# Patient Record
Sex: MALE | Race: WHITE | NOT HISPANIC OR LATINO | Employment: OTHER | URBAN - NONMETROPOLITAN AREA
[De-identification: names, ages, dates, MRNs, and addresses within clinical notes are randomized per-mention and may not be internally consistent; named-entity substitution may affect disease eponyms.]

---

## 2020-07-27 ENCOUNTER — HOSPITAL ENCOUNTER (INPATIENT)
Facility: HOSPITAL | Age: 75
LOS: 2 days | Discharge: HOME | DRG: 176 | End: 2020-07-29
Attending: EMERGENCY MEDICINE | Admitting: FAMILY MEDICINE
Payer: MEDICARE

## 2020-07-27 ENCOUNTER — APPOINTMENT (EMERGENCY)
Dept: CT IMAGING | Facility: HOSPITAL | Age: 75
DRG: 176 | End: 2020-07-27
Payer: MEDICARE

## 2020-07-27 ENCOUNTER — APPOINTMENT (INPATIENT)
Dept: NON INVASIVE DIAGNOSTICS | Facility: HOSPITAL | Age: 75
DRG: 176 | End: 2020-07-27
Payer: MEDICARE

## 2020-07-27 DIAGNOSIS — I10 HTN (HYPERTENSION): ICD-10-CM

## 2020-07-27 DIAGNOSIS — I26.99 ACUTE PULMONARY EMBOLISM WITHOUT ACUTE COR PULMONALE, UNSPECIFIED PULMONARY EMBOLISM TYPE (HCC): Primary | ICD-10-CM

## 2020-07-27 DIAGNOSIS — R07.1 CHEST PAIN ON BREATHING: ICD-10-CM

## 2020-07-27 DIAGNOSIS — I26.99 OTHER ACUTE PULMONARY EMBOLISM WITHOUT ACUTE COR PULMONALE (HCC): ICD-10-CM

## 2020-07-27 PROBLEM — E87.2 LACTIC ACIDOSIS: Status: ACTIVE | Noted: 2020-07-27

## 2020-07-27 PROBLEM — R07.9 CHEST PAIN: Status: ACTIVE | Noted: 2020-07-27

## 2020-07-27 PROBLEM — N17.9 ACUTE KIDNEY INJURY (HCC): Status: ACTIVE | Noted: 2020-07-27

## 2020-07-27 PROBLEM — E87.20 LACTIC ACIDOSIS: Status: ACTIVE | Noted: 2020-07-27

## 2020-07-27 LAB
ALBUMIN SERPL BCP-MCNC: 3.8 G/DL (ref 3.5–5)
ALP SERPL-CCNC: 68 U/L (ref 46–116)
ALT SERPL W P-5'-P-CCNC: 28 U/L (ref 12–78)
ANION GAP SERPL CALCULATED.3IONS-SCNC: 10 MMOL/L (ref 4–13)
APTT PPP: 30 SECONDS (ref 23–37)
APTT PPP: >210 SECONDS (ref 23–37)
AST SERPL W P-5'-P-CCNC: 16 U/L (ref 5–45)
BASOPHILS # BLD AUTO: 0.02 THOUSANDS/ΜL (ref 0–0.1)
BASOPHILS NFR BLD AUTO: 0 % (ref 0–1)
BILIRUB SERPL-MCNC: 0.59 MG/DL (ref 0.2–1)
BUN SERPL-MCNC: 40 MG/DL (ref 5–25)
CALCIUM SERPL-MCNC: 9 MG/DL (ref 8.3–10.1)
CHLORIDE SERPL-SCNC: 106 MMOL/L (ref 100–108)
CO2 SERPL-SCNC: 26 MMOL/L (ref 21–32)
CREAT SERPL-MCNC: 1.41 MG/DL (ref 0.6–1.3)
EOSINOPHIL # BLD AUTO: 0.18 THOUSAND/ΜL (ref 0–0.61)
EOSINOPHIL NFR BLD AUTO: 3 % (ref 0–6)
ERYTHROCYTE [DISTWIDTH] IN BLOOD BY AUTOMATED COUNT: 12.2 % (ref 11.6–15.1)
GFR SERPL CREATININE-BSD FRML MDRD: 49 ML/MIN/1.73SQ M
GLUCOSE SERPL-MCNC: 77 MG/DL (ref 65–140)
HCT VFR BLD AUTO: 41.3 % (ref 36.5–49.3)
HGB BLD-MCNC: 14.3 G/DL (ref 12–17)
IMM GRANULOCYTES # BLD AUTO: 0.02 THOUSAND/UL (ref 0–0.2)
IMM GRANULOCYTES NFR BLD AUTO: 0 % (ref 0–2)
INR PPP: 1 (ref 0.84–1.19)
LACTATE SERPL-SCNC: 1.1 MMOL/L (ref 0.5–2)
LACTATE SERPL-SCNC: 2.2 MMOL/L (ref 0.5–2)
LIPASE SERPL-CCNC: 255 U/L (ref 73–393)
LYMPHOCYTES # BLD AUTO: 1.57 THOUSANDS/ΜL (ref 0.6–4.47)
LYMPHOCYTES NFR BLD AUTO: 24 % (ref 14–44)
MCH RBC QN AUTO: 31.7 PG (ref 26.8–34.3)
MCHC RBC AUTO-ENTMCNC: 34.6 G/DL (ref 31.4–37.4)
MCV RBC AUTO: 92 FL (ref 82–98)
MONOCYTES # BLD AUTO: 0.61 THOUSAND/ΜL (ref 0.17–1.22)
MONOCYTES NFR BLD AUTO: 10 % (ref 4–12)
NEUTROPHILS # BLD AUTO: 4.04 THOUSANDS/ΜL (ref 1.85–7.62)
NEUTS SEG NFR BLD AUTO: 63 % (ref 43–75)
NRBC BLD AUTO-RTO: 0 /100 WBCS
NT-PROBNP SERPL-MCNC: 123 PG/ML
PLATELET # BLD AUTO: 151 THOUSANDS/UL (ref 149–390)
PMV BLD AUTO: 9.2 FL (ref 8.9–12.7)
POTASSIUM SERPL-SCNC: 3.8 MMOL/L (ref 3.5–5.3)
PROT SERPL-MCNC: 6.9 G/DL (ref 6.4–8.2)
PROTHROMBIN TIME: 13.2 SECONDS (ref 11.6–14.5)
RBC # BLD AUTO: 4.51 MILLION/UL (ref 3.88–5.62)
SODIUM SERPL-SCNC: 142 MMOL/L (ref 136–145)
TROPONIN I SERPL-MCNC: <0.02 NG/ML
WBC # BLD AUTO: 6.44 THOUSAND/UL (ref 4.31–10.16)

## 2020-07-27 PROCEDURE — 36415 COLL VENOUS BLD VENIPUNCTURE: CPT | Performed by: EMERGENCY MEDICINE

## 2020-07-27 PROCEDURE — 99448 NTRPROF PH1/NTRNET/EHR 21-30: CPT | Performed by: INTERNAL MEDICINE

## 2020-07-27 PROCEDURE — 85025 COMPLETE CBC W/AUTO DIFF WBC: CPT | Performed by: EMERGENCY MEDICINE

## 2020-07-27 PROCEDURE — 93306 TTE W/DOPPLER COMPLETE: CPT

## 2020-07-27 PROCEDURE — 83605 ASSAY OF LACTIC ACID: CPT | Performed by: EMERGENCY MEDICINE

## 2020-07-27 PROCEDURE — 85730 THROMBOPLASTIN TIME PARTIAL: CPT | Performed by: FAMILY MEDICINE

## 2020-07-27 PROCEDURE — 80053 COMPREHEN METABOLIC PANEL: CPT | Performed by: EMERGENCY MEDICINE

## 2020-07-27 PROCEDURE — 96361 HYDRATE IV INFUSION ADD-ON: CPT

## 2020-07-27 PROCEDURE — 96360 HYDRATION IV INFUSION INIT: CPT

## 2020-07-27 PROCEDURE — 99285 EMERGENCY DEPT VISIT HI MDM: CPT | Performed by: EMERGENCY MEDICINE

## 2020-07-27 PROCEDURE — 99222 1ST HOSP IP/OBS MODERATE 55: CPT | Performed by: FAMILY MEDICINE

## 2020-07-27 PROCEDURE — 83690 ASSAY OF LIPASE: CPT | Performed by: EMERGENCY MEDICINE

## 2020-07-27 PROCEDURE — 85610 PROTHROMBIN TIME: CPT | Performed by: EMERGENCY MEDICINE

## 2020-07-27 PROCEDURE — 83880 ASSAY OF NATRIURETIC PEPTIDE: CPT | Performed by: EMERGENCY MEDICINE

## 2020-07-27 PROCEDURE — 71275 CT ANGIOGRAPHY CHEST: CPT

## 2020-07-27 PROCEDURE — 99285 EMERGENCY DEPT VISIT HI MDM: CPT

## 2020-07-27 PROCEDURE — 85730 THROMBOPLASTIN TIME PARTIAL: CPT | Performed by: EMERGENCY MEDICINE

## 2020-07-27 PROCEDURE — 84484 ASSAY OF TROPONIN QUANT: CPT | Performed by: EMERGENCY MEDICINE

## 2020-07-27 RX ORDER — SODIUM CHLORIDE 9 MG/ML
75 INJECTION, SOLUTION INTRAVENOUS CONTINUOUS
Status: DISCONTINUED | OUTPATIENT
Start: 2020-07-27 | End: 2020-07-28

## 2020-07-27 RX ORDER — MELATONIN
50000 DAILY
COMMUNITY
End: 2020-07-29 | Stop reason: HOSPADM

## 2020-07-27 RX ORDER — ATORVASTATIN CALCIUM 10 MG/1
10 TABLET, FILM COATED ORAL 2 TIMES WEEKLY
COMMUNITY

## 2020-07-27 RX ORDER — HEPARIN SODIUM 1000 [USP'U]/ML
3800 INJECTION, SOLUTION INTRAVENOUS; SUBCUTANEOUS
Status: DISCONTINUED | OUTPATIENT
Start: 2020-07-27 | End: 2020-07-29

## 2020-07-27 RX ORDER — RIBOFLAVIN (VITAMIN B2) 100 MG
3000 TABLET ORAL DAILY
COMMUNITY

## 2020-07-27 RX ORDER — HEPARIN SODIUM 10000 [USP'U]/100ML
3-30 INJECTION, SOLUTION INTRAVENOUS
Status: DISCONTINUED | OUTPATIENT
Start: 2020-07-27 | End: 2020-07-29

## 2020-07-27 RX ORDER — HEPARIN SODIUM 1000 [USP'U]/ML
7600 INJECTION, SOLUTION INTRAVENOUS; SUBCUTANEOUS ONCE
Status: COMPLETED | OUTPATIENT
Start: 2020-07-27 | End: 2020-07-27

## 2020-07-27 RX ORDER — CALCIUM CARBONATE 200(500)MG
1000 TABLET,CHEWABLE ORAL DAILY PRN
Status: DISCONTINUED | OUTPATIENT
Start: 2020-07-27 | End: 2020-07-29 | Stop reason: HOSPADM

## 2020-07-27 RX ORDER — KETOROLAC TROMETHAMINE 30 MG/ML
15 INJECTION, SOLUTION INTRAMUSCULAR; INTRAVENOUS ONCE
Status: DISCONTINUED | OUTPATIENT
Start: 2020-07-27 | End: 2020-07-27

## 2020-07-27 RX ORDER — ATORVASTATIN CALCIUM 10 MG/1
10 TABLET, FILM COATED ORAL 2 TIMES WEEKLY
Status: DISCONTINUED | OUTPATIENT
Start: 2020-07-27 | End: 2020-07-29 | Stop reason: HOSPADM

## 2020-07-27 RX ORDER — HEPARIN SODIUM 1000 [USP'U]/ML
7600 INJECTION, SOLUTION INTRAVENOUS; SUBCUTANEOUS
Status: DISCONTINUED | OUTPATIENT
Start: 2020-07-27 | End: 2020-07-29

## 2020-07-27 RX ORDER — MULTIVITAMIN
1 CAPSULE ORAL DAILY
COMMUNITY

## 2020-07-27 RX ORDER — ACETAMINOPHEN 325 MG/1
650 TABLET ORAL EVERY 6 HOURS PRN
Status: DISCONTINUED | OUTPATIENT
Start: 2020-07-27 | End: 2020-07-29 | Stop reason: HOSPADM

## 2020-07-27 RX ADMIN — HEPARIN SODIUM 7600 UNITS: 1000 INJECTION, SOLUTION INTRAVENOUS; SUBCUTANEOUS at 14:06

## 2020-07-27 RX ADMIN — IOHEXOL 85 ML: 350 INJECTION, SOLUTION INTRAVENOUS at 12:21

## 2020-07-27 RX ADMIN — MORPHINE SULFATE 2 MG: 2 INJECTION, SOLUTION INTRAMUSCULAR; INTRAVENOUS at 22:47

## 2020-07-27 RX ADMIN — SODIUM CHLORIDE 75 ML/HR: 0.9 INJECTION, SOLUTION INTRAVENOUS at 15:58

## 2020-07-27 RX ADMIN — SODIUM CHLORIDE 1000 ML: 0.9 INJECTION, SOLUTION INTRAVENOUS at 10:46

## 2020-07-27 RX ADMIN — HEPARIN SODIUM AND DEXTROSE 18 UNITS/KG/HR: 10000; 5 INJECTION INTRAVENOUS at 14:05

## 2020-07-27 RX ADMIN — MORPHINE SULFATE 2 MG: 2 INJECTION, SOLUTION INTRAMUSCULAR; INTRAVENOUS at 14:56

## 2020-07-27 NOTE — ED PROVIDER NOTES
History  Chief Complaint   Patient presents with    Chest Pain     Pt  c/o rigth lateral chest pain near axillary area beginning yesterday after working outside - relieved by Tylenol- same today - hurts with inspiration     Patient was working on a should yesterday when he developed right chest pain around his right axilla region  Got worse with deep breath  Took Tylenol yesterday with some relief  Pain again occurred today and took some warm Tylenol again with some relief  Still gets worse with very deep breath  No history of PE or DVT  No rash or trauma  No recent cough or cold symptoms  No change with certain movements  No history of similar episodes  No diaphoresis  No nausea or vomiting  Does not smoke  No history of hypertension  No history of diabetes  No history of heart disease  Pain has never gone completely away since yesterday  Patient recently had surgery on his right forearm secondary to melanoma  History provided by:  Patient   used: No    Chest Pain   Pain location:  R chest  Pain quality: aching    Pain radiates to:  Does not radiate  Pain radiates to the back: no    Pain severity:  Mild  Onset quality:  Sudden  Duration:  1 day  Timing:  Constant  Progression:  Waxing and waning  Chronicity:  New  Context: breathing    Context: not eating, not lifting, not raising an arm, not at rest and no trauma    Relieved by: Tylenol    Worsened by:  Deep breathing  Ineffective treatments:  None tried  Associated symptoms: no abdominal pain, no altered mental status, no anorexia, no back pain, no claudication, no cough, no diaphoresis, no dizziness, no dysphagia, no fatigue, no fever, no headache, no nausea, no near-syncope, no palpitations, no shortness of breath and not vomiting    Risk factors: no aortic disease, no Marfan's syndrome, no prior DVT/PE and no smoking        None       Past Medical History:   Diagnosis Date    Basal cell carcinoma of skin     multiple sites    Hypercholesterolemia     Skin cancer (melanoma) ([facility] [address] )     stage 0       History reviewed  No pertinent surgical history  History reviewed  No pertinent family history  I have reviewed and agree with the history as documented  E-Cigarette/Vaping    E-Cigarette Use Never User      E-Cigarette/Vaping Substances     Social History     Tobacco Use    Smoking status: Former Smoker    Smokeless tobacco: Never Used   Substance Use Topics    Alcohol use: Yes     Frequency: Monthly or less     Drinks per session: 1 or 2     Binge frequency: Never    Drug use: Not Currently       Review of Systems   Constitutional: Negative for chills, diaphoresis, fatigue and fever  HENT: Negative for ear pain, hearing loss, sore throat, trouble swallowing and voice change  Eyes: Negative for pain and discharge  Respiratory: Negative for cough, shortness of breath and wheezing  Cardiovascular: Positive for chest pain  Negative for palpitations, claudication and near-syncope  Gastrointestinal: Negative for abdominal pain, anorexia, blood in stool, constipation, diarrhea, nausea and vomiting  Genitourinary: Negative for dysuria, flank pain, frequency and hematuria  Musculoskeletal: Negative for back pain, joint swelling, neck pain and neck stiffness  Skin: Negative for rash and wound  Neurological: Negative for dizziness, seizures, syncope, facial asymmetry and headaches  Psychiatric/Behavioral: Negative for hallucinations, self-injury and suicidal ideas  All other systems reviewed and are negative  Physical Exam  Physical Exam   Constitutional: He is oriented to person, place, and time  He appears well-developed and well-nourished  No distress  HENT:   Head: Normocephalic and atraumatic  Right Ear: External ear normal    Left Ear: External ear normal    Eyes: Pupils are equal, round, and reactive to light  Conjunctivae and EOM are normal    Neck: Normal range of motion  Neck supple  Cardiovascular: Normal rate, regular rhythm and normal heart sounds  No murmur heard  Pulmonary/Chest: Effort normal and breath sounds normal  He has no wheezes  He has no rales  Abdominal: Soft  Bowel sounds are normal  He exhibits no distension  There is no tenderness  There is no rebound and no guarding  Musculoskeletal: Normal range of motion  He exhibits no deformity  Left forearm surgical site is clean dry and intact  Healing well  No signs of infection  Neurological: He is alert and oriented to person, place, and time  No cranial nerve deficit  Skin: Skin is warm and dry  No rash noted  Psychiatric: He has a normal mood and affect  His behavior is normal    Nursing note and vitals reviewed        Vital Signs  ED Triage Vitals   Temp Pulse Respirations Blood Pressure SpO2   -- 07/27/20 1030 07/27/20 1030 07/27/20 1030 07/27/20 1030    87 20 148/72 95 %      Temp Source Heart Rate Source Patient Position - Orthostatic VS BP Location FiO2 (%)   07/27/20 1031 07/27/20 1031 07/27/20 1031 07/27/20 1031 --   Temporal Monitor Lying Right arm       Pain Score       07/27/20 1031       No Pain           Vitals:    07/27/20 1030 07/27/20 1031 07/27/20 1100 07/27/20 1200   BP: 148/72 148/72 140/63 148/65   Pulse: 87 84 71 65   Patient Position - Orthostatic VS:  Lying           Visual Acuity      ED Medications  Medications   ketorolac (TORADOL) injection 15 mg (15 mg Intravenous Not Given 7/27/20 1043)   heparin (VTE/PE) high (has no administration in time range)   sodium chloride 0 9 % bolus 1,000 mL (0 mL Intravenous Stopped 7/27/20 1224)   iohexol (OMNIPAQUE) 350 MG/ML injection (MULTI-DOSE) 85 mL (85 mL Intravenous Given 7/27/20 1221)       Diagnostic Studies  Results Reviewed     Procedure Component Value Units Date/Time    Protime-INR [[id number]]     Lab Status:  No result Specimen:  Blood     APTT [[id number]]     Lab Status:  No result Specimen:  Blood     Lactic acid 2 Hours [[id number]] (Normal) Collected:  07/27/20 1227    Lab Status:  Final result Specimen:  Blood from Arm, Left Updated:  07/27/20 1252     LACTIC ACID 1 1 mmol/L     Narrative:       Result may be elevated if tourniquet was used during collection  NT-BNP PRO [[id number]]  (Normal) Collected:  07/27/20 1043    Lab Status:  Final result Specimen:  Blood from Arm, Left Updated:  07/27/20 1141     NT-proBNP 123 pg/mL     Comprehensive metabolic panel [[id number]]  (Abnormal) Collected:  07/27/20 1043    Lab Status:  Final result Specimen:  Blood from Arm, Left Updated:  07/27/20 1120     Sodium 142 mmol/L      Potassium 3 8 mmol/L      Chloride 106 mmol/L      CO2 26 mmol/L      ANION GAP 10 mmol/L      BUN 40 mg/dL      Creatinine 1 41 mg/dL      Glucose 77 mg/dL      Calcium 9 0 mg/dL      AST 16 U/L      ALT 28 U/L      Alkaline Phosphatase 68 U/L      Total Protein 6 9 g/dL      Albumin 3 8 g/dL      Total Bilirubin 0 59 mg/dL      eGFR 49 ml/min/1 73sq m     Narrative:       [place] guidelines for Chronic Kidney Disease (CKD):     Stage 1 with normal or high GFR (GFR > 90 mL/min/1 73 square meters)    Stage 2 Mild CKD (GFR = 60-89 mL/min/1 73 square meters)    Stage 3A Moderate CKD (GFR = 45-59 mL/min/1 73 square meters)    Stage 3B Moderate CKD (GFR = 30-44 mL/min/1 73 square meters)    Stage 4 Severe CKD (GFR = 15-29 mL/min/1 73 square meters)    Stage 5 End Stage CKD (GFR <15 mL/min/1 73 square meters)  Note: GFR calculation is accurate only with a steady state creatinine    Lipase [[id number]]  (Normal) Collected:  07/27/20 1043    Lab Status:  Final result Specimen:  Blood from Arm, Left Updated:  07/27/20 1120     Lipase 255 u/L     Lactic acid [[id number]]  (Abnormal) Collected:  07/27/20 1043    Lab Status:  Final result Specimen:  Blood from Arm, Left Updated:  07/27/20 1114     LACTIC ACID 2 2 mmol/L     Narrative:       Result may be elevated if tourniquet was used during collection  Troponin I [[id number]]  (Normal) Collected:  07/27/20 1043    Lab Status:  Final result Specimen:  Blood from Arm, Left Updated:  07/27/20 1110     Troponin I <0 02 ng/mL     CBC and differential [[id number]] Collected:  07/27/20 1043    Lab Status:  Final result Specimen:  Blood from Arm, Left Updated:  07/27/20 1056     WBC 6 44 Thousand/uL      RBC 4 51 Million/uL      Hemoglobin 14 3 g/dL      Hematocrit 41 3 %      MCV 92 fL      MCH 31 7 pg      MCHC 34 6 g/dL      RDW 12 2 %      MPV 9 2 fL      Platelets 224 Thousands/uL      nRBC 0 /100 WBCs      Neutrophils Relative 63 %      Immat GRANS % 0 %      Lymphocytes Relative 24 %      Monocytes Relative 10 %      Eosinophils Relative 3 %      Basophils Relative 0 %      Neutrophils Absolute 4 04 Thousands/µL      Immature Grans Absolute 0 02 Thousand/uL      Lymphocytes Absolute 1 57 Thousands/µL      Monocytes Absolute 0 61 Thousand/µL      Eosinophils Absolute 0 18 Thousand/µL      Basophils Absolute 0 02 Thousands/µL                  CTA ED chest PE study   Final Result by Yovany Marrero MD (07/27 1246)      Acute pulmonary emboli  Measured RV/LV ratio is within normal limits at less than 0 9  I personally discussed this study with Dr Dustin Torres in the ED at [facility] in [facility] on 7/27/2020 at 12:38 PM                Workstation performed: [id number]                    Procedures  ECG 12 Lead Documentation Only  Date/Time: 7/27/2020 10:39 AM  Performed by: James Ness MD  Authorized by: James Ness MD     ECG reviewed by me, the ED Provider: yes    Patient location:  ED  Previous ECG:     Previous ECG:  Unavailable  Rate:     ECG rate:  90  Rhythm:     Rhythm: sinus rhythm    Ectopy:     Ectopy: none    QRS:     QRS axis:  Normal             ED Course       US AUDIT      Most Recent Value   Initial Alcohol Screen: US AUDIT-C    1  How often do you have a drink containing alcohol? 2 Filed at: 07/27/2020 1036   2   How many drinks containing alcohol do you have on a typical day you are drinking? 0 Filed at: 07/27/2020 1036   3a  Male UNDER 65: How often do you have five or more drinks on one occasion? 0 Filed at: 07/27/2020 1036   3b  FEMALE Any Age, or MALE 65+: How often do you have 4 or more drinks on one occassion? 2 Filed at: 07/27/2020 1036   Audit-C Score  4 Filed at: 07/27/2020 1036                  CARIN/DAST-10      Most Recent Value   How many times in the past year have you    Used an illegal drug or used a prescription medication for non-medical reasons? Never Filed at: 07/27/2020 1036                                MDM  Number of Diagnoses or Management Options     Amount and/or Complexity of Data Reviewed  Clinical lab tests: reviewed  Review and summarize past medical records: yes          Disposition  Final diagnoses:   Acute pulmonary embolism without acute cor pulmonale, unspecified pulmonary embolism type ([address] )     Time reflects when diagnosis was documented in both MDM as applicable and the Disposition within this note     Time User Action Codes Description Comment    7/27/2020 12:52 PM Visperas, Retia Runner P Add [I26 99] Acute pulmonary embolism without acute cor pulmonale, unspecified pulmonary embolism type [facility])       ED Disposition     ED Disposition Condition Date/Time Comment    Admit Stable Mon Jul 27, 2020 12:52 PM Case was discussed with Jhonathan Acharya and the patient's admission status was agreed to be  to the service of Dr Jhonathan Hussein  Follow-up Information    None         Patient's Medications    No medications on file     No discharge procedures on file      PDMP Review     None          ED Provider  Electronically Signed by           Cass Penn MD  07/27/20 8010

## 2020-07-27 NOTE — CONSULTS
Interprofessional Telephone Consultation Note - Pulmonary and Critical Care Medicine  Marcie Rush 77 y o  male MRN: [id number]  Unit/Bed#: ED 03 Encounter: [id number]    Consult requested location:  [address] [place] [address]  Physician Requesting Consult: Farzad Kaufman MD  Reason for Consult:  PE    Assessment/Plan:    1  Acute unprovoked PE, no clear etiology or risk factors  · Continue anticoagulation with heparin infusion  · Transition to NOAC preferably if covered by insurance otherwise he will need Coumadin with bridging with heparin infusion for at least 5 days to maintain INR 2-3  · If patient being treated with NOAC and stable to more than he can be discharged home  · Will need anticoagulation for at least 4-6 months  · Outpatient follow up with Pulmonary in the future if needed  · Follow with his PCP for cancer screening workup appropriate for his age  3  Mild TORY versus a chronic CKD, unknown baseline  · Recommend hydration p o  And IV overnight, check BMP in the morning  3  History of resected melanoma    Pertinent details:   Patient is otherwise healthy except for resected small melanomas with good margin and no evidence of metastasis, presents with chest pain and found to have right-sided main stem PE  He is currently doing fine with no complaints and no oxygen needs  No recent travel history, no recent infectious process, no recent surgery or illness  No other history of cancer  Currently on heparin drip  I reviewed labs including BMP/CBC, creatinine 1 41, hemoglobin 14 3, troponin 0 02, proBNP 123    Chest CT scan reviewed on PACs:  Large right pulmonary artery PE extending to the right lower lobe  otherwise no significant infiltrates except for some atelectatic take changes in the right lower lobe and right upper lobe    Echocardiogram pending        This evaluation was performed remotely since pulmonology service was not available for immediate bedside evaluation    Personal interview of the patient and physical examination therefore has not been performed  Time spent for discussion with the requesting provider, review of the chart, evaluation of diagnostic data and review of the imaging personally was 30 minutes  Recommendations were personally discussed with Dr Brooklyn Rosario   >50% of the time documented was direct discussion with the requesting provider    Nato Henao MD

## 2020-07-27 NOTE — ASSESSMENT & PLAN NOTE
Right-sided chest pain secondary to acute pulmonary embolism  Continue supportive care and morphine for pain control in addition to heparin drip    EKG shows normal sinus rhythm with no ST segment changes

## 2020-07-27 NOTE — ASSESSMENT & PLAN NOTE
Probably secondary to acute pulmonary embolism  No evidence of infection at this time  Received IV fluid hydration following which lactic acidosis was resolving

## 2020-07-27 NOTE — ASSESSMENT & PLAN NOTE
Patient presented with 2 day history of right upper chest pain at rest and with exertion  Found to have acute right main pulmonary artery embolism as well as right upper and lower lobe pulmonary arterial branches on CTA chest   Patient denies any recent travel or long car rides or any other provoking factors  He states that he has been experiencing some progressive shortness of breath over the last few months which he felt was secondary to old age    Currently denies any shortness of breath at rest but does have shortness of breath with exertion  Denies any syncopal episodes or palpitations  Placed on heparin drip  Stat 2D echo obtained to evaluate RV function  Recheck labs tomorrow and will transition to Eliquis if hemodynamically stable tomorrow  Will probably need to be anticoagulated for at least 6 months as it is an unprovoked episode    No prior history of blood clots

## 2020-07-27 NOTE — ASSESSMENT & PLAN NOTE
Baseline creatinine unknown  Creatinine admission is 1 4    Placed on IV fluid hydration and avoid nephrotoxic agents and hypotension

## 2020-07-27 NOTE — PLAN OF CARE
Problem: Potential for Falls  Goal: Patient will remain free of falls  Description  INTERVENTIONS:  - Assess patient frequently for physical needs  -  Identify cognitive and physical deficits and behaviors that affect risk of falls    -  [place] fall precautions as indicated by assessment   - Educate patient/family on patient safety including physical limitations  - Instruct patient to call for assistance with activity based on assessment  - Modify environment to reduce risk of injury  - Consider OT/PT consult to assist with strengthening/mobility  Outcome: Progressing     Problem: PAIN - ADULT  Goal: Verbalizes/displays adequate comfort level or baseline comfort level  Description  Interventions:  - Encourage patient to monitor pain and request assistance  - Assess pain using appropriate pain scale  - Administer analgesics based on type and severity of pain and evaluate response  - Implement non-pharmacological measures as appropriate and evaluate response  - Consider cultural and social influences on pain and pain management  - Notify physician/advanced practitioner if interventions unsuccessful or patient reports new pain  Outcome: Progressing     Problem: SAFETY ADULT  Goal: Maintain or return to baseline ADL function  Description  INTERVENTIONS:  -  Assess patient's ability to carry out ADLs; assess patient's baseline for ADL function and identify physical deficits which impact ability to perform ADLs (bathing, care of mouth/teeth, toileting, grooming, dressing, etc )  - Assess/evaluate cause of self-care deficits   - Assess range of motion  - Assess patient's mobility; develop plan if impaired  - Assess patient's need for assistive devices and provide as appropriate  - Encourage maximum independence but intervene and supervise when necessary  - Involve family in performance of ADLs  - Assess for home care needs following discharge   - Consider OT consult to assist with ADL evaluation and planning for discharge  - Provide patient education as appropriate  Outcome: Progressing  Goal: Maintain or return mobility status to optimal level  Description  INTERVENTIONS:  - Assess patient's baseline mobility status (ambulation, transfers, stairs, etc )    - Identify cognitive and physical deficits and behaviors that affect mobility  - Identify mobility aids required to assist with transfers and/or ambulation (gait belt, sit-to-stand, lift, walker, cane, etc )  - [place] fall precautions as indicated by assessment  - Record patient progress and toleration of activity level on Mobility SBAR; progress patient to next Phase/Stage  - Instruct patient to call for assistance with activity based on assessment  - Consider rehabilitation consult to assist with strengthening/weightbearing, etc   Outcome: Progressing     Problem: DISCHARGE PLANNING  Goal: Discharge to home or other facility with appropriate resources  Description  INTERVENTIONS:  - Identify barriers to discharge w/patient and caregiver  - Arrange for needed discharge resources and transportation as appropriate  - Identify discharge learning needs (meds, wound care, etc )  - Arrange for interpretive services to assist at discharge as needed  - Refer to Case Management Department for coordinating discharge planning if the patient needs post-hospital services based on physician/advanced practitioner order or complex needs related to functional status, cognitive ability, or social support system  Outcome: Progressing     Problem: Knowledge Deficit  Goal: Patient/family/caregiver demonstrates understanding of disease process, treatment plan, medications, and discharge instructions  Description  Complete learning assessment and assess knowledge base    Interventions:  - Provide teaching at level of understanding  - Provide teaching via preferred learning methods  Outcome: Progressing

## 2020-07-27 NOTE — H&P
H&P- Jennifer Stewart 1945, 77 y o  male MRN: [id number]    Unit/Bed#: ED 03 Encounter: [id number]    Primary Care Provider: Rajesh Palacio MD   Date and time admitted to hospital: 7/27/2020 10:26 AM        * Acute pulmonary embolism without acute cor pulmonale [facility])  Assessment & Plan  Patient presented with 2 day history of right upper chest pain at rest and with exertion  Found to have acute right main pulmonary artery embolism as well as right upper and lower lobe pulmonary arterial branches on CTA chest   Patient denies any recent travel or long car rides or any other provoking factors  He states that he has been experiencing some progressive shortness of breath over the last few months which he felt was secondary to old age    Currently denies any shortness of breath at rest but does have shortness of breath with exertion  Denies any syncopal episodes or palpitations  Placed on heparin drip  Stat 2D echo obtained to evaluate RV function  Recheck labs tomorrow and will transition to Eliquis if hemodynamically stable tomorrow  Will probably need to be anticoagulated for at least 6 months as it is an unprovoked episode  No prior history of blood clots    Chest pain  Assessment & Plan  Right-sided chest pain secondary to acute pulmonary embolism  Continue supportive care and morphine for pain control in addition to heparin drip  EKG shows normal sinus rhythm with no ST segment changes    Lactic acidosis  Assessment & Plan  Probably secondary to acute pulmonary embolism  No evidence of infection at this time  Received IV fluid hydration following which lactic acidosis was resolving  Acute kidney injury [facility])  Assessment & Plan  Baseline creatinine unknown  Creatinine admission is 1 4  Placed on IV fluid hydration and avoid nephrotoxic agents and hypotension    Mixed hyperlipidemia:  Patient states he takes atorvastatin 10 mg twice a week    Continue the same  VTE Prophylaxis: Heparin Drip / reason for no mechanical VTE prophylaxis On heparin drip   Code Status:  Full code  POLST: There is no POLST form on file for this patient (pre-hospital)  Discussion with family:  None    Anticipated Length of Stay:  Patient will be admitted on an Inpatient basis with an anticipated length of stay of  more than 2 midnights  Justification for Hospital Stay:  Chest pain    Total Time for Visit, including Counseling / Coordination of Care: 1 hour  Greater than 50% of this total time spent on direct patient counseling and coordination of care  Chief Complaint:   Right-sided chest pain    History of Present Illness:    Claudio Chun is a 77 y o  male who presents with right-sided chest pain  Patient states over the last few months he has been having some progressively worsening dyspnea with exertion  States that for the last 2 days he started to have right upper chest pain for which he was taking Tylenol several times with limited relief  He decided to come to the ER to get checked out  Denies any recent fevers, chills, cough cold runny nose  No recent travel or sick contact  No prolonged immobilization  No prior history of blood clots  Patient states that he is a relatively healthy man and takes no medications at home except for atorvastatin twice a week    Review of Systems:    Review of Systems   Constitutional: Positive for fatigue  Negative for appetite change, chills and fever  HENT: Negative for hearing loss, sore throat and trouble swallowing  Eyes: Negative for photophobia, discharge and visual disturbance  Respiratory: Positive for shortness of breath  Negative for chest tightness  Cardiovascular: Positive for chest pain  Negative for palpitations  Gastrointestinal: Negative for abdominal pain, blood in stool and vomiting  Endocrine: Negative for polydipsia and polyuria  Genitourinary: Negative for difficulty urinating, dysuria, flank pain and hematuria     Musculoskeletal: Negative for back pain and gait problem  Skin: Negative for rash  Allergic/Immunologic: Negative for environmental allergies and food allergies  Neurological: Negative for dizziness, seizures, syncope and headaches  Hematological: Does not bruise/bleed easily  Psychiatric/Behavioral: Negative for behavioral problems  All other systems reviewed and are negative  Past Medical and Surgical History:     Past Medical History:   Diagnosis Date    Basal cell carcinoma of skin     multiple sites    Hypercholesterolemia     Skin cancer (melanoma) ([facility] [address] )     stage 0       Past Surgical History:   Procedure Laterality Date    ARM SKIN LESION BIOPSY / EXCISION         Meds/Allergies:    Prior to Admission medications    Not on File     I have reviewed home medications with patient personally  Allergies: Allergies   Allergen Reactions    Penicillins Other (See Comments)     Sores on tongue       Social History:     Marital Status:    Social History     Substance and Sexual Activity   Alcohol Use Yes    Frequency: Monthly or less    Drinks per session: 1 or 2    Binge frequency: Never     Social History     Tobacco Use   Smoking Status Former Smoker   Smokeless Tobacco Never Used     Social History     Substance and Sexual Activity   Drug Use Not Currently       Family History:    Family History   Problem Relation Age of Onset    Arthritis Mother        Physical Exam:     Vitals:   Blood Pressure: 166/81 (07/27/20 1408)  Pulse: 70 (07/27/20 1408)  Temp Source: Temporal (07/27/20 1031)  Respirations: 20 (07/27/20 1408)  Height: 6' 1" (185 4 cm) (07/27/20 1031)  Weight - Scale: 96 4 kg (212 lb 8 4 oz) (07/27/20 1031)  SpO2: 96 % (07/27/20 1408)    Physical Exam   Constitutional: He is oriented to person, place, and time  He appears well-developed and well-nourished  HENT:   Head: Normocephalic and atraumatic     Right Ear: External ear normal    Left Ear: External ear normal    Mouth/Throat: Oropharynx is clear and moist    Eyes: Pupils are equal, round, and reactive to light  Conjunctivae and EOM are normal    Neck: Normal range of motion  Neck supple  Cardiovascular: Normal rate, regular rhythm and normal heart sounds  Tenderness on palpation of the right upper chest wall   Pulmonary/Chest: Effort normal and breath sounds normal    Abdominal: Soft  Bowel sounds are normal  He exhibits no mass  There is no tenderness  There is no rebound and no guarding  Genitourinary:   Genitourinary Comments: deferred   Musculoskeletal: Normal range of motion  Neurological: He is alert and oriented to person, place, and time  He has normal reflexes  Cranial nerves 2-12 are normal   Normal neurological exam   Skin: Skin is warm and dry  No rash noted  Psychiatric: He has a normal mood and affect  Nursing note and vitals reviewed  Additional Data:     Lab Results: I have personally reviewed pertinent reports  Results from last 7 days   Lab Units 07/27/20  1043   WBC Thousand/uL 6 44   HEMOGLOBIN g/dL 14 3   HEMATOCRIT % 41 3   PLATELETS Thousands/uL 151   NEUTROS PCT % 63   LYMPHS PCT % 24   MONOS PCT % 10   EOS PCT % 3     Results from last 7 days   Lab Units 07/27/20  1043   SODIUM mmol/L 142   POTASSIUM mmol/L 3 8   CHLORIDE mmol/L 106   CO2 mmol/L 26   BUN mg/dL 40*   CREATININE mg/dL 1 41*   ANION GAP mmol/L 10   CALCIUM mg/dL 9 0   ALBUMIN g/dL 3 8   TOTAL BILIRUBIN mg/dL 0 59   ALK PHOS U/L 68   ALT U/L 28   AST U/L 16   GLUCOSE RANDOM mg/dL 77     Results from last 7 days   Lab Units 07/27/20  1302   INR  1 00             Results from last 7 days   Lab Units 07/27/20  1227 07/27/20  1043   LACTIC ACID mmol/L 1 1 2 2*       Imaging: I have personally reviewed pertinent reports  CTA ED chest PE study   Final Result by Ludmila Gauthier MD (07/27 1246)      Acute pulmonary emboli  Measured RV/LV ratio is within normal limits at less than 0 9                 I personally discussed this study with Dr Alvarez Seen in the ED at [facility] in [facility] on 7/27/2020 at 12:38 PM                Workstation performed: [id number]             EKG, Pathology, and Other Studies Reviewed on Admission:   · EKG:sinus rhythm    Allscripts / Epic Records Reviewed: Yes     ** Please Note: This note has been constructed using a voice recognition system   **

## 2020-07-28 PROBLEM — I10 HTN (HYPERTENSION): Status: ACTIVE | Noted: 2020-07-28

## 2020-07-28 LAB
ANION GAP SERPL CALCULATED.3IONS-SCNC: 7 MMOL/L (ref 4–13)
APTT PPP: 119 SECONDS (ref 23–37)
APTT PPP: 67 SECONDS (ref 23–37)
APTT PPP: 80 SECONDS (ref 23–37)
ATRIAL RATE: 67 BPM
BUN SERPL-MCNC: 30 MG/DL (ref 5–25)
CALCIUM SERPL-MCNC: 8.1 MG/DL (ref 8.3–10.1)
CHLORIDE SERPL-SCNC: 106 MMOL/L (ref 100–108)
CO2 SERPL-SCNC: 26 MMOL/L (ref 21–32)
CREAT SERPL-MCNC: 1.18 MG/DL (ref 0.6–1.3)
ERYTHROCYTE [DISTWIDTH] IN BLOOD BY AUTOMATED COUNT: 12.5 % (ref 11.6–15.1)
GFR SERPL CREATININE-BSD FRML MDRD: 60 ML/MIN/1.73SQ M
GLUCOSE SERPL-MCNC: 97 MG/DL (ref 65–140)
HCT VFR BLD AUTO: 35.9 % (ref 36.5–49.3)
HGB BLD-MCNC: 12.2 G/DL (ref 12–17)
MCH RBC QN AUTO: 31.3 PG (ref 26.8–34.3)
MCHC RBC AUTO-ENTMCNC: 34 G/DL (ref 31.4–37.4)
MCV RBC AUTO: 92 FL (ref 82–98)
P AXIS: 73 DEGREES
PLATELET # BLD AUTO: 148 THOUSANDS/UL (ref 149–390)
PMV BLD AUTO: 9.2 FL (ref 8.9–12.7)
POTASSIUM SERPL-SCNC: 4.2 MMOL/L (ref 3.5–5.3)
PR INTERVAL: 180 MS
QRS AXIS: -9 DEGREES
QRSD INTERVAL: 98 MS
QT INTERVAL: 414 MS
QTC INTERVAL: 437 MS
RBC # BLD AUTO: 3.9 MILLION/UL (ref 3.88–5.62)
SODIUM SERPL-SCNC: 139 MMOL/L (ref 136–145)
T WAVE AXIS: 39 DEGREES
TROPONIN I SERPL-MCNC: <0.02 NG/ML
TSH SERPL DL<=0.05 MIU/L-ACNC: 1.78 UIU/ML (ref 0.36–3.74)
VENTRICULAR RATE: 67 BPM
WBC # BLD AUTO: 6.25 THOUSAND/UL (ref 4.31–10.16)

## 2020-07-28 PROCEDURE — 93005 ELECTROCARDIOGRAM TRACING: CPT

## 2020-07-28 PROCEDURE — 85730 THROMBOPLASTIN TIME PARTIAL: CPT | Performed by: FAMILY MEDICINE

## 2020-07-28 PROCEDURE — 84484 ASSAY OF TROPONIN QUANT: CPT | Performed by: NURSE PRACTITIONER

## 2020-07-28 PROCEDURE — 80048 BASIC METABOLIC PNL TOTAL CA: CPT | Performed by: FAMILY MEDICINE

## 2020-07-28 PROCEDURE — 99232 SBSQ HOSP IP/OBS MODERATE 35: CPT | Performed by: FAMILY MEDICINE

## 2020-07-28 PROCEDURE — 84443 ASSAY THYROID STIM HORMONE: CPT | Performed by: FAMILY MEDICINE

## 2020-07-28 PROCEDURE — 85027 COMPLETE CBC AUTOMATED: CPT | Performed by: FAMILY MEDICINE

## 2020-07-28 RX ORDER — PREDNISONE 20 MG/1
20 TABLET ORAL DAILY
Status: DISCONTINUED | OUTPATIENT
Start: 2020-07-28 | End: 2020-07-29 | Stop reason: HOSPADM

## 2020-07-28 RX ORDER — AMLODIPINE BESYLATE 2.5 MG/1
2.5 TABLET ORAL DAILY
Status: DISCONTINUED | OUTPATIENT
Start: 2020-07-28 | End: 2020-07-29 | Stop reason: HOSPADM

## 2020-07-28 RX ADMIN — PREDNISONE 20 MG: 20 TABLET ORAL at 13:29

## 2020-07-28 RX ADMIN — SODIUM CHLORIDE 75 ML/HR: 0.9 INJECTION, SOLUTION INTRAVENOUS at 04:51

## 2020-07-28 RX ADMIN — ACETAMINOPHEN 650 MG: 325 TABLET ORAL at 17:03

## 2020-07-28 RX ADMIN — MORPHINE SULFATE 2 MG: 2 INJECTION, SOLUTION INTRAMUSCULAR; INTRAVENOUS at 08:47

## 2020-07-28 RX ADMIN — HEPARIN SODIUM AND DEXTROSE 12 UNITS/KG/HR: 10000; 5 INJECTION INTRAVENOUS at 10:38

## 2020-07-28 RX ADMIN — MORPHINE SULFATE 2 MG: 2 INJECTION, SOLUTION INTRAMUSCULAR; INTRAVENOUS at 02:32

## 2020-07-28 RX ADMIN — AMLODIPINE BESYLATE 2.5 MG: 2.5 TABLET ORAL at 13:29

## 2020-07-28 NOTE — ASSESSMENT & PLAN NOTE
Right-sided chest pain secondary to acute pulmonary embolism/pleurisy   Continue supportive care and morphine for pain control in addition to heparin drip    EKG shows normal sinus rhythm with no ST segment changes  No pulmonary infarct   Will give low dose steroids 20 mg po daily x5 days of prednisone to decrease inflammation

## 2020-07-28 NOTE — PROGRESS NOTES
Progress Note - Khanh Pandey 1945, 77 y o  male MRN: [id number]    Unit/Bed#: -01 Encounter: [id number]    Primary Care Provider: Allan Garcia MD   Date and time admitted to hospital: 7/27/2020 10:26 AM        HTN (hypertension)  Assessment & Plan  · No history off - cam be due to pain /white coat htn as he had previously- dc fluids  · Place on low dose norvasc 2 5 mg po daily hold if sbp<140    Lactic acidosis  Assessment & Plan  Probably secondary to acute pulmonary embolism  No evidence of infection at this time  Received IV fluid hydration following which lactic acidosis was resolved    Acute kidney injury [facility])  Assessment & Plan  Resolved Baseline creatinine unknown  Creatinine admission is 1 4  Dc iv fluids avoid nephrotoxic agents and hypotension    Chest pain  Assessment & Plan  Right-sided chest pain secondary to acute pulmonary embolism/pleurisy   Continue supportive care and morphine for pain control in addition to heparin drip  EKG shows normal sinus rhythm with no ST segment changes  No pulmonary infarct   Will give low dose steroids 20 mg po daily x5 days of prednisone to decrease inflammation     * Acute pulmonary embolism without acute cor pulmonale [facility])  Assessment & Plan  Patient presented with 2 day history of right upper chest pain at rest and with exertion  Found to have acute right main pulmonary artery embolism as well as right upper and lower lobe pulmonary arterial branches on CTA chest   Patient denies any recent travel or long car rides or any other provoking factors  He states that he has been experiencing some progressive shortness of breath over the last few months which he felt was secondary to old age    Currently denies any shortness of breath at rest but does have shortness of breath with exertion  Denies any syncopal episodes or palpitations  on heparin drip  Stat 2D echo obtained to evaluate RV function- normall       Will probably need to be anticoagulated for at least 6 months as it is an unprovoked episode  No prior history of blood clots  Send eliquis to pharmacy for insurance check  If pleurisy better amrit will place on elqiuis and dc home      VTE Pharmacologic Prophylaxis:   Pharmacologic: Heparin Drip  Mechanical VTE Prophylaxis in Place: Yes    Patient Centered Rounds: I have performed bedside rounds with nursing staff today  Discussions with Specialists or Other Care Team Provider: nursing     Education and Discussions with Family / Patient: patient    Time Spent for Care: 30 minutes  More than 50% of total time spent on counseling and coordination of care as described above  Current Length of Stay: 1 day(s)    Current Patient Status: Inpatient   Certification Statement: The patient will continue to require additional inpatient hospital stay due to pleurisy    Discharge Plan: 24 hours pending progress    Code Status: Level 1 - Full Code      Subjective:   Patient seen and examined, still pleuritic chest pain  Some sob when ambulates   Objective:     Vitals:   Temp (24hrs), Av °F (36 7 °C), Min:97 7 °F (36 5 °C), Max:98 2 °F (36 8 °C)    Temp:  [97 7 °F (36 5 °C)-98 2 °F (36 8 °C)] 98 °F (36 7 °C)  HR:  [66-77] 76  Resp:  [16-23] 18  BP: (140-191)/() 168/89  SpO2:  [96 %-99 %] 97 %  Body mass index is 27 6 kg/m²  Input and Output Summary (last 24 hours): Intake/Output Summary (Last 24 hours) at 2020 1319  Last data filed at 2020 0845  Gross per 24 hour   Intake 1566 25 ml   Output 1380 ml   Net 186 25 ml       Physical Exam:     Physical Exam   Constitutional: He is oriented to person, place, and time  He appears well-developed and well-nourished  HENT:   Head: Normocephalic and atraumatic  Eyes: Pupils are equal, round, and reactive to light  EOM are normal    Neck: Normal range of motion  Cardiovascular: Normal rate, regular rhythm and normal heart sounds     Pulmonary/Chest: Effort normal  He exhibits no tenderness  Decreased at bases   Abdominal: Soft  Bowel sounds are normal    Musculoskeletal: Normal range of motion  Neurological: He is alert and oriented to person, place, and time  He has normal reflexes  cranial nerve 2-12 are normal   Normal neurological exam   Skin: Skin is warm  Psychiatric: He has a normal mood and affect  Additional Data:     Labs:    Results from last 7 days   Lab Units 07/28/20  0236 07/27/20  1043   WBC Thousand/uL 6 25 6 44   HEMOGLOBIN g/dL 12 2 14 3   HEMATOCRIT % 35 9* 41 3   PLATELETS Thousands/uL 148* 151   NEUTROS PCT %  --  63   LYMPHS PCT %  --  24   MONOS PCT %  --  10   EOS PCT %  --  3     Results from last 7 days   Lab Units 07/28/20  0236 07/27/20  1043   SODIUM mmol/L 139 142   POTASSIUM mmol/L 4 2 3 8   CHLORIDE mmol/L 106 106   CO2 mmol/L 26 26   BUN mg/dL 30* 40*   CREATININE mg/dL 1 18 1 41*   ANION GAP mmol/L 7 10   CALCIUM mg/dL 8 1* 9 0   ALBUMIN g/dL  --  3 8   TOTAL BILIRUBIN mg/dL  --  0 59   ALK PHOS U/L  --  68   ALT U/L  --  28   AST U/L  --  16   GLUCOSE RANDOM mg/dL 97 77     Results from last 7 days   Lab Units 07/27/20  1302   INR  1 00             Results from last 7 days   Lab Units 07/27/20  1227 07/27/20  1043   LACTIC ACID mmol/L 1 1 2 2*           * I Have Reviewed All Lab Data Listed Above  * Additional Pertinent Lab Tests Reviewed:  All Labs Within Last 24 Hours Reviewed    Imaging:    Imaging Reports Reviewed Today Include: none today   Imaging Personally Reviewed by Myself Includes:      Recent Cultures (last 7 days):           Last 24 Hours Medication List:     Current Facility-Administered Medications:  acetaminophen 650 mg Oral Q6H PRN Vic Watters MD    amLODIPine 2 5 mg Oral Daily Hi Hunt MD    atorvastatin 10 mg Oral Once per day on Mon Thu Vic Watters MD    calcium carbonate 1,000 mg Oral Daily PRN Vic Watters MD    heparin (porcine) 3-30 Units/kg/hr (Order-Specific) Intravenous Titrated Vic Watters MD Last Rate: 12 Units/kg/hr (07/28/20 1038)   heparin (porcine) 3,800 Units Intravenous Q1H PRN Manda Barrett MD    heparin (porcine) 7,600 Units Intravenous Q1H PRN Manda Barrett MD    morphine injection 2 mg Intravenous Q4H PRN Manda Barrett MD    predniSONE 20 mg Oral Daily Min North MD         Today, Patient Was Seen By: Min North MD    ** Please Note: Dictation voice to text software may have been used in the creation of this document   **

## 2020-07-28 NOTE — ASSESSMENT & PLAN NOTE
Probably secondary to acute pulmonary embolism  No evidence of infection at this time    Received IV fluid hydration following which lactic acidosis was resolved

## 2020-07-28 NOTE — ASSESSMENT & PLAN NOTE
· No history off - cam be due to pain /white coat htn as he had previously- dc fluids  · Place on low dose norvasc 2 5 mg po daily hold if sbp<140

## 2020-07-28 NOTE — ASSESSMENT & PLAN NOTE
Resolved Baseline creatinine unknown  Creatinine admission is 1 4    Dc iv fluids avoid nephrotoxic agents and hypotension

## 2020-07-28 NOTE — PLAN OF CARE
Problem: Potential for Falls  Goal: Patient will remain free of falls  Description  INTERVENTIONS:  - Assess patient frequently for physical needs  -  Identify cognitive and physical deficits and behaviors that affect risk of falls    -  [place] fall precautions as indicated by assessment   - Educate patient/family on patient safety including physical limitations  - Instruct patient to call for assistance with activity based on assessment  - Modify environment to reduce risk of injury  - Consider OT/PT consult to assist with strengthening/mobility  Outcome: Progressing     Problem: PAIN - ADULT  Goal: Verbalizes/displays adequate comfort level or baseline comfort level  Description  Interventions:  - Encourage patient to monitor pain and request assistance  - Assess pain using appropriate pain scale  - Administer analgesics based on type and severity of pain and evaluate response  - Implement non-pharmacological measures as appropriate and evaluate response  - Consider cultural and social influences on pain and pain management  - Notify physician/advanced practitioner if interventions unsuccessful or patient reports new pain  Outcome: Progressing     Problem: SAFETY ADULT  Goal: Maintain or return to baseline ADL function  Description  INTERVENTIONS:  -  Assess patient's ability to carry out ADLs; assess patient's baseline for ADL function and identify physical deficits which impact ability to perform ADLs (bathing, care of mouth/teeth, toileting, grooming, dressing, etc )  - Assess/evaluate cause of self-care deficits   - Assess range of motion  - Assess patient's mobility; develop plan if impaired  - Assess patient's need for assistive devices and provide as appropriate  - Encourage maximum independence but intervene and supervise when necessary  - Involve family in performance of ADLs  - Assess for home care needs following discharge   - Consider OT consult to assist with ADL evaluation and planning for discharge  - Provide patient education as appropriate  Outcome: Progressing  Goal: Maintain or return mobility status to optimal level  Description  INTERVENTIONS:  - Assess patient's baseline mobility status (ambulation, transfers, stairs, etc )    - Identify cognitive and physical deficits and behaviors that affect mobility  - Identify mobility aids required to assist with transfers and/or ambulation (gait belt, sit-to-stand, lift, walker, cane, etc )  - [place] fall precautions as indicated by assessment  - Record patient progress and toleration of activity level on Mobility SBAR; progress patient to next Phase/Stage  - Instruct patient to call for assistance with activity based on assessment  - Consider rehabilitation consult to assist with strengthening/weightbearing, etc   Outcome: Progressing     Problem: DISCHARGE PLANNING  Goal: Discharge to home or other facility with appropriate resources  Description  INTERVENTIONS:  - Identify barriers to discharge w/patient and caregiver  - Arrange for needed discharge resources and transportation as appropriate  - Identify discharge learning needs (meds, wound care, etc )  - Arrange for interpretive services to assist at discharge as needed  - Refer to Case Management Department for coordinating discharge planning if the patient needs post-hospital services based on physician/advanced practitioner order or complex needs related to functional status, cognitive ability, or social support system  Outcome: Progressing     Problem: Knowledge Deficit  Goal: Patient/family/caregiver demonstrates understanding of disease process, treatment plan, medications, and discharge instructions  Description  Complete learning assessment and assess knowledge base    Interventions:  - Provide teaching at level of understanding  - Provide teaching via preferred learning methods  Outcome: Progressing

## 2020-07-28 NOTE — ASSESSMENT & PLAN NOTE
Patient presented with 2 day history of right upper chest pain at rest and with exertion  Found to have acute right main pulmonary artery embolism as well as right upper and lower lobe pulmonary arterial branches on CTA chest   Patient denies any recent travel or long car rides or any other provoking factors  He states that he has been experiencing some progressive shortness of breath over the last few months which he felt was secondary to old age    Currently denies any shortness of breath at rest but does have shortness of breath with exertion  Denies any syncopal episodes or palpitations  on heparin drip  Stat 2D echo obtained to evaluate RV function- normall     Will probably need to be anticoagulated for at least 6 months as it is an unprovoked episode  No prior history of blood clots   Send eliquis to pharmacy for insurance check  If pleurisy better amrit will place on [facility]

## 2020-07-29 VITALS
DIASTOLIC BLOOD PRESSURE: 89 MMHG | RESPIRATION RATE: 17 BRPM | TEMPERATURE: 97.8 F | SYSTOLIC BLOOD PRESSURE: 167 MMHG | OXYGEN SATURATION: 99 % | BODY MASS INDEX: 27.73 KG/M2 | WEIGHT: 209.22 LBS | HEIGHT: 73 IN | HEART RATE: 72 BPM

## 2020-07-29 LAB
APTT PPP: 103 SECONDS (ref 23–37)
APTT PPP: 70 SECONDS (ref 23–37)
BASOPHILS # BLD AUTO: 0.01 THOUSANDS/ΜL (ref 0–0.1)
BASOPHILS NFR BLD AUTO: 0 % (ref 0–1)
EOSINOPHIL # BLD AUTO: 0.02 THOUSAND/ΜL (ref 0–0.61)
EOSINOPHIL NFR BLD AUTO: 0 % (ref 0–6)
ERYTHROCYTE [DISTWIDTH] IN BLOOD BY AUTOMATED COUNT: 11.9 % (ref 11.6–15.1)
HCT VFR BLD AUTO: 35.9 % (ref 36.5–49.3)
HGB BLD-MCNC: 12.9 G/DL (ref 12–17)
IMM GRANULOCYTES # BLD AUTO: 0.01 THOUSAND/UL (ref 0–0.2)
IMM GRANULOCYTES NFR BLD AUTO: 0 % (ref 0–2)
LYMPHOCYTES # BLD AUTO: 1.2 THOUSANDS/ΜL (ref 0.6–4.47)
LYMPHOCYTES NFR BLD AUTO: 18 % (ref 14–44)
MCH RBC QN AUTO: 32.2 PG (ref 26.8–34.3)
MCHC RBC AUTO-ENTMCNC: 35.9 G/DL (ref 31.4–37.4)
MCV RBC AUTO: 90 FL (ref 82–98)
MONOCYTES # BLD AUTO: 0.55 THOUSAND/ΜL (ref 0.17–1.22)
MONOCYTES NFR BLD AUTO: 8 % (ref 4–12)
NEUTROPHILS # BLD AUTO: 4.97 THOUSANDS/ΜL (ref 1.85–7.62)
NEUTS SEG NFR BLD AUTO: 74 % (ref 43–75)
NRBC BLD AUTO-RTO: 0 /100 WBCS
PLATELET # BLD AUTO: 150 THOUSANDS/UL (ref 149–390)
PMV BLD AUTO: 9.1 FL (ref 8.9–12.7)
RBC # BLD AUTO: 4.01 MILLION/UL (ref 3.88–5.62)
WBC # BLD AUTO: 6.76 THOUSAND/UL (ref 4.31–10.16)

## 2020-07-29 PROCEDURE — 85730 THROMBOPLASTIN TIME PARTIAL: CPT | Performed by: FAMILY MEDICINE

## 2020-07-29 PROCEDURE — 99239 HOSP IP/OBS DSCHRG MGMT >30: CPT | Performed by: FAMILY MEDICINE

## 2020-07-29 PROCEDURE — 85025 COMPLETE CBC W/AUTO DIFF WBC: CPT | Performed by: FAMILY MEDICINE

## 2020-07-29 RX ORDER — PREDNISONE 20 MG/1
20 TABLET ORAL DAILY
Qty: 3 TABLET | Refills: 0 | Status: SHIPPED | OUTPATIENT
Start: 2020-07-30 | End: 2020-08-02

## 2020-07-29 RX ORDER — AMLODIPINE BESYLATE 5 MG/1
5 TABLET ORAL DAILY
Qty: 30 TABLET | Refills: 0 | Status: SHIPPED | OUTPATIENT
Start: 2020-07-29

## 2020-07-29 RX ADMIN — AMLODIPINE BESYLATE 2.5 MG: 2.5 TABLET ORAL at 10:17

## 2020-07-29 RX ADMIN — PREDNISONE 20 MG: 20 TABLET ORAL at 10:17

## 2020-07-29 NOTE — ASSESSMENT & PLAN NOTE
· No history off - cam be due to pain /white coat htn as he had previously- dc fluids  · Increased to 5 mg daily although improved still suboptimal

## 2020-07-29 NOTE — PLAN OF CARE
Problem: Potential for Falls  Goal: Patient will remain free of falls  Description  INTERVENTIONS:  - Assess patient frequently for physical needs  -  Identify cognitive and physical deficits and behaviors that affect risk of falls    -  [place] fall precautions as indicated by assessment   - Educate patient/family on patient safety including physical limitations  - Instruct patient to call for assistance with activity based on assessment  - Modify environment to reduce risk of injury  - Consider OT/PT consult to assist with strengthening/mobility  Outcome: Progressing     Problem: PAIN - ADULT  Goal: Verbalizes/displays adequate comfort level or baseline comfort level  Description  Interventions:  - Encourage patient to monitor pain and request assistance  - Assess pain using appropriate pain scale  - Administer analgesics based on type and severity of pain and evaluate response  - Implement non-pharmacological measures as appropriate and evaluate response  - Consider cultural and social influences on pain and pain management  - Notify physician/advanced practitioner if interventions unsuccessful or patient reports new pain  Outcome: Progressing     Problem: SAFETY ADULT  Goal: Maintain or return to baseline ADL function  Description  INTERVENTIONS:  -  Assess patient's ability to carry out ADLs; assess patient's baseline for ADL function and identify physical deficits which impact ability to perform ADLs (bathing, care of mouth/teeth, toileting, grooming, dressing, etc )  - Assess/evaluate cause of self-care deficits   - Assess range of motion  - Assess patient's mobility; develop plan if impaired  - Assess patient's need for assistive devices and provide as appropriate  - Encourage maximum independence but intervene and supervise when necessary  - Involve family in performance of ADLs  - Assess for home care needs following discharge   - Consider OT consult to assist with ADL evaluation and planning for discharge  - Provide patient education as appropriate  Outcome: Progressing  Goal: Maintain or return mobility status to optimal level  Description  INTERVENTIONS:  - Assess patient's baseline mobility status (ambulation, transfers, stairs, etc )    - Identify cognitive and physical deficits and behaviors that affect mobility  - Identify mobility aids required to assist with transfers and/or ambulation (gait belt, sit-to-stand, lift, walker, cane, etc )  - [place] fall precautions as indicated by assessment  - Record patient progress and toleration of activity level on Mobility SBAR; progress patient to next Phase/Stage  - Instruct patient to call for assistance with activity based on assessment  - Consider rehabilitation consult to assist with strengthening/weightbearing, etc   Outcome: Progressing     Problem: DISCHARGE PLANNING  Goal: Discharge to home or other facility with appropriate resources  Description  INTERVENTIONS:  - Identify barriers to discharge w/patient and caregiver  - Arrange for needed discharge resources and transportation as appropriate  - Identify discharge learning needs (meds, wound care, etc )  - Arrange for interpretive services to assist at discharge as needed  - Refer to Case Management Department for coordinating discharge planning if the patient needs post-hospital services based on physician/advanced practitioner order or complex needs related to functional status, cognitive ability, or social support system  Outcome: Progressing     Problem: Knowledge Deficit  Goal: Patient/family/caregiver demonstrates understanding of disease process, treatment plan, medications, and discharge instructions  Description  Complete learning assessment and assess knowledge base    Interventions:  - Provide teaching at level of understanding  - Provide teaching via preferred learning methods  Outcome: Progressing

## 2020-07-29 NOTE — ASSESSMENT & PLAN NOTE
Patient presented with 2 day history of right upper chest pain at rest and with exertion  Found to have acute right main pulmonary artery embolism as well as right upper and lower lobe pulmonary arterial branches on CTA chest   Patient denies any recent travel or long car rides or any other provoking factors  He states that he has been experiencing some progressive shortness of breath over the last few months which he felt was secondary to old age    Stat 2D echo obtained to evaluate RV function- normall     Will probably need to be anticoagulated for at least 6 months as it is an unprovoked episode  No prior history of blood clots  He needs to follow-up with the hematology is as an outpatient to have a coagulopathy workup I did make a referral ambulatory  After discussion with the patient and case management patient is going to be able to afford pain for Eliquis he knows he is going to stay on it for 4-6 months a he does not want to be on any Coumadin  So I will start him on Eliquis 10 mg p o  B i d  For 7 days and then Eliquis 5 mg p o  B i d  For 6 months treatment  His chest pain is down to 110 he is able to ambulate more and shortness of breath is improved he does not need any oxygen I discussed with him to avoid any heavy lifting that he will face some shortness of breath slightly for some time to the clot eventually resolved

## 2020-07-29 NOTE — SOCIAL WORK
Current LOS 2 days  CM completed chart reviewed  Pt case reviewed with attending Dr Malcom Muñiz  Pt being treated for Acute Pulmonary Embolism  Pt is recommended for Eliquis  Will need anticoagulation for at least 4-6 months  Attending Dr Malcom Muñiz sent script to pt home [facility] [place]  CM f/u with pharmacy  Pt copayment for starter pack is $482 as he has not yet met his OOP/Deductable  Pt states he is not able afford the cost  Prior to making the decision to bridge to Coumadin, CM will f/u with pt insurance to get information on deductible  CM met with patient at the bedside,baseline information was obtained  CM discussed the role of CM in helping the patient develop a discharge plan and assist the patient in carry out their plan  Pt lives with his friend Royce Richards in a [address], [address]  Pt completed ADLs independently  Pt ambulates independently  No DME  Pt is retired  Pt drives  Pt has no hx of STR or HHC  Pt denies hx of MH or D&A tx  PCP: Dr Nicolas Shah  Preferred Pharmacy: [facility] in [place]     Contact: [address] (friend) [phone]  No formal POA  Pt states Kaela would speak on her behalf  Kaela with transport pt home at time of d/c      CM and pt had a discussion on cost of Eliquis and the option of bridging to Coumadin  Pt not willing to remain in hospital or do f/u for INR monitoring  Pt is agreeable to Eliquis stating he would "make it work"  CM was able to provide pt pharmacy with the 30-free trial offer  CM confirmed that medication is in stock  Pharmacy is open until 5pm for

## 2020-07-29 NOTE — ASSESSMENT & PLAN NOTE
Right-sided chest pain secondary to acute pulmonary embolism/pleurisy improved to 1/10 he is able to take deep breaths after started prednisone   Continue supportive care and morphine for pain control in addition to heparin drip  EKG shows normal sinus rhythm with no ST segment changes  No pulmonary infarct   Continue prednisone 20 mg daily for 4 more days

## 2020-07-29 NOTE — DISCHARGE SUMMARY
Discharge- Deretha Severance 1945, 77 y o  male MRN: [id number]    Unit/Bed#: -01 Encounter: [id number]    Primary Care Provider: Lissa Leroy MD   Date and time admitted to hospital: 7/27/2020 10:26 AM        HTN (hypertension)  Assessment & Plan  · No history off - cam be due to pain /white coat htn as he had previously- dc fluids  · Increased to 5 mg daily although improved still suboptimal    Lactic acidosis  Assessment & Plan  Probably secondary to acute pulmonary embolism  No evidence of infection at this time  Resolved    Acute kidney injury [facility])  Assessment & Plan  Resolved Baseline creatinine unknown  Creatinine admission is 1 4  Dc iv fluids avoid nephrotoxic agents and hypotension    Chest pain  Assessment & Plan  Right-sided chest pain secondary to acute pulmonary embolism/pleurisy improved to 1/10 he is able to take deep breaths after started prednisone   Continue supportive care and morphine for pain control in addition to heparin drip  EKG shows normal sinus rhythm with no ST segment changes  No pulmonary infarct   Continue prednisone 20 mg daily for 4 more days  * Acute pulmonary embolism without acute cor pulmonale [facility])  Assessment & Plan  Patient presented with 2 day history of right upper chest pain at rest and with exertion  Found to have acute right main pulmonary artery embolism as well as right upper and lower lobe pulmonary arterial branches on CTA chest   Patient denies any recent travel or long car rides or any other provoking factors  He states that he has been experiencing some progressive shortness of breath over the last few months which he felt was secondary to old age    Stat 2D echo obtained to evaluate RV function- normall     Will probably need to be anticoagulated for at least 6 months as it is an unprovoked episode  No prior history of blood clots    He needs to follow-up with the hematology is as an outpatient to have a coagulopathy workup I did make a referral ambulatory  After discussion with the patient and case management patient is going to be able to afford pain for Eliquis he knows he is going to stay on it for 4-6 months a he does not want to be on any Coumadin  So I will start him on Eliquis 10 mg p o  B i d  For 7 days and then Eliquis 5 mg p o  B i d  For 6 months treatment  His chest pain is down to 110 he is able to ambulate more and shortness of breath is improved he does not need any oxygen I discussed with him to avoid any heavy lifting that he will face some shortness of breath slightly for some time to the clot eventually resolved  Discharging Physician / Practitioner: Clemente Perez MD  PCP: Dahlia Morris MD  Admission Date:   Admission Orders (From admission, onward)     Ordered        07/27/20 1255  Inpatient Admission (expected length of stay for this patient Order details is greater than two midnights)  Once                   Discharge Date: 07/29/20    Resolved Problems  Date Reviewed: 7/29/2020    None          Consultations During Hospital Stay:  · Pulmonary    Procedures Performed:   · None    Significant Findings / Test Results:   · CT PE study- Acute pulmonary emboli  Measured RV/LV ratio is within normal limits at less than 0 9  · 2D echo-Systolic function was normal  Ejection fraction was estimated in the range of 55 % to 65 %  There were no regional wall motion abnormalities  Doppler parameters were consistent with abnormal left ventricular relaxation (grade 1 diastolic dysfunction)  normal rv     Incidental Findings:   · None     Test Results Pending at Discharge (will require follow up):    · None     Outpatient Tests Requested:  · None    Complications:  None    Reason for Admission:  Shortness of breath    Hospital Course:     Isaias Scanlon is a 77 y o  male patient who originally presented to the hospital on 7/27/2020 due to progressive shortness of breath was found to have unprovoked pulmonary embolism with a 2D echo showing no cor pulmonale evaluated by pulmonology he will need outpatient screening for any cancer he is appropriate for his age and also follow-up with hematology for coagulopathic workup  After long-term discussion in terms anticoagulation he decided to proceed with Eliquis which she will continue 4-6 months he states he is going to be able to pay for it while he he was on heparin so he was transition as an outpatient on Eliquis he did have some pleurisy which actually improved significantly with prednisone he will continue 4 more days and his shortness of breath has improved as well  He has on hypertension secondary to acute kidney injury on admission with resolution of with fluids I did not start any Ace or hydrochlorothiazide a place him on Norvasc and he could be re-evaluated at his primary care office  He is not hypoxic shortness of breath has improved he is medically clear to be discharged home        Please see above list of diagnoses and related plan for additional information  Condition at Discharge: stable     Discharge Day Visit / Exam:     Subjective:  Patient seen and examined denies any significant chest pain is down to 1/10 and shortness of breath has improved  Vitals: Blood Pressure: 167/89 (07/29/20 0752)  Pulse: 72 (07/29/20 0752)  Temperature: 97 8 °F (36 6 °C) (07/29/20 0752)  Temp Source: Oral (07/28/20 2251)  Respirations: 17 (07/29/20 0752)  Height: 6' 1" (185 4 cm) (07/27/20 1542)  Weight - Scale: 94 9 kg (209 lb 3 5 oz) (07/27/20 1542)  SpO2: 99 % (07/29/20 1017)  Exam:   Physical Exam   Constitutional: He is oriented to person, place, and time  He appears well-developed and well-nourished  HENT:   Head: Normocephalic and atraumatic  Eyes: Pupils are equal, round, and reactive to light  EOM are normal    Neck: Normal range of motion  Cardiovascular: Normal rate, regular rhythm and normal heart sounds     Pulmonary/Chest: Effort normal and breath sounds normal  Abdominal: Soft  Bowel sounds are normal    Musculoskeletal: Normal range of motion  Neurological: He is alert and oriented to person, place, and time  He has normal reflexes  cranial nerve 2-12 are normal   Normal neurological exam   Skin: Skin is warm  Psychiatric: He has a normal mood and affect  Discussion with Family: no    Discharge instructions/Information to patient and family:   See after visit summary for information provided to patient and family  Provisions for Follow-Up Care:  See after visit summary for information related to follow-up care and any pertinent home health orders  Disposition:     Home    For Discharges to [facility] SNF:   · Not Applicable to this Patient - Not Applicable to this Patient    Planned Readmission: no     Discharge Statement:  I spent >35 minutes discharging the patient  This time was spent on the day of discharge  I had direct contact with the patient on the day of discharge  Greater than 50% of the total time was spent examining patient, answering all patient questions, arranging and discussing plan of care with patient as well as directly providing post-discharge instructions  Additional time then spent on discharge activities  Discharge Medications:  See after visit summary for reconciled discharge medications provided to patient and family        ** Please Note: This note has been constructed using a voice recognition system **

## 2020-07-29 NOTE — NURSING NOTE
Patient discharged to home  Left in stable condition  After visit summary reviewed with patient, verbalizes understanding  All belongings taken home

## 2020-08-05 ENCOUNTER — TRANSCRIBE ORDERS (OUTPATIENT)
Dept: ADMINISTRATIVE | Facility: HOSPITAL | Age: 75
End: 2020-08-05

## 2020-08-05 DIAGNOSIS — I26.92 SADDLE EMBOLUS OF PULMONARY ARTERY, UNSPECIFIED CHRONICITY, UNSPECIFIED WHETHER ACUTE COR PULMONALE PRESENT (HCC): Primary | ICD-10-CM

## 2020-08-06 ENCOUNTER — HOSPITAL ENCOUNTER (OUTPATIENT)
Dept: NON INVASIVE DIAGNOSTICS | Facility: HOSPITAL | Age: 75
Discharge: HOME | End: 2020-08-06
Payer: MEDICARE

## 2020-08-06 DIAGNOSIS — I26.92 SADDLE EMBOLUS OF PULMONARY ARTERY, UNSPECIFIED CHRONICITY, UNSPECIFIED WHETHER ACUTE COR PULMONALE PRESENT (HCC): ICD-10-CM

## 2020-08-06 PROCEDURE — 93971 EXTREMITY STUDY: CPT | Performed by: SURGERY

## 2020-08-06 PROCEDURE — 93971 EXTREMITY STUDY: CPT

## 2020-08-19 ENCOUNTER — TRANSCRIBE ORDERS (OUTPATIENT)
Dept: ADMINISTRATIVE | Facility: HOSPITAL | Age: 75
End: 2020-08-19

## 2020-08-19 DIAGNOSIS — R16.1 SPLENOMEGALY, NOT ELSEWHERE CLASSIFIED: ICD-10-CM

## 2020-08-19 DIAGNOSIS — R16.1 SPLENOMEGALY: ICD-10-CM

## 2020-08-19 DIAGNOSIS — I26.99 OTHER PULMONARY EMBOLISM WITHOUT ACUTE COR PULMONALE (HCC): ICD-10-CM

## 2020-08-19 DIAGNOSIS — T81.718A IATROGENIC PULMONARY EMBOLISM AND INFARCTION, INITIAL ENCOUNTER (HCC): Primary | ICD-10-CM

## 2020-08-19 DIAGNOSIS — I26.99 IATROGENIC PULMONARY EMBOLISM AND INFARCTION, INITIAL ENCOUNTER (HCC): Primary | ICD-10-CM

## 2020-08-26 ENCOUNTER — HOSPITAL ENCOUNTER (OUTPATIENT)
Dept: CT IMAGING | Facility: HOSPITAL | Age: 75
Discharge: HOME | End: 2020-08-26
Payer: MEDICARE

## 2020-08-26 DIAGNOSIS — I26.99 OTHER PULMONARY EMBOLISM WITHOUT ACUTE COR PULMONALE (HCC): ICD-10-CM

## 2020-08-26 DIAGNOSIS — R16.1 SPLENOMEGALY, NOT ELSEWHERE CLASSIFIED: ICD-10-CM

## 2020-08-26 PROCEDURE — 74160 CT ABDOMEN W/CONTRAST: CPT

## 2020-08-26 RX ADMIN — IOHEXOL 100 ML: 350 INJECTION, SOLUTION INTRAVENOUS at 11:46

## 2020-10-02 ENCOUNTER — TRANSCRIBE ORDERS (OUTPATIENT)
Dept: ADMINISTRATIVE | Facility: HOSPITAL | Age: 75
End: 2020-10-02

## 2021-02-12 DIAGNOSIS — Z23 ENCOUNTER FOR IMMUNIZATION: ICD-10-CM

## 2021-05-24 ENCOUNTER — TRANSCRIBE ORDERS (OUTPATIENT)
Dept: ADMINISTRATIVE | Facility: HOSPITAL | Age: 76
End: 2021-05-24

## 2021-05-24 DIAGNOSIS — S90.32XD CONTUSION OF LEFT FOOT, SUBSEQUENT ENCOUNTER: Primary | ICD-10-CM

## 2021-05-26 ENCOUNTER — HOSPITAL ENCOUNTER (OUTPATIENT)
Dept: NON INVASIVE DIAGNOSTICS | Facility: HOSPITAL | Age: 76
Discharge: HOME | End: 2021-05-26
Payer: MEDICARE

## 2021-05-26 DIAGNOSIS — S90.32XD CONTUSION OF LEFT FOOT, SUBSEQUENT ENCOUNTER: ICD-10-CM

## 2021-05-26 PROCEDURE — 93971 EXTREMITY STUDY: CPT | Performed by: SURGERY

## 2021-05-26 PROCEDURE — 93971 EXTREMITY STUDY: CPT

## 2022-04-05 ENCOUNTER — HOSPITAL ENCOUNTER (EMERGENCY)
Facility: HOSPITAL | Age: 77
Discharge: HOME | End: 2022-04-05
Attending: EMERGENCY MEDICINE | Admitting: EMERGENCY MEDICINE
Payer: MEDICARE

## 2022-04-05 ENCOUNTER — APPOINTMENT (EMERGENCY)
Dept: CT IMAGING | Facility: HOSPITAL | Age: 77
End: 2022-04-05
Payer: MEDICARE

## 2022-04-05 VITALS
WEIGHT: 208 LBS | HEART RATE: 74 BPM | OXYGEN SATURATION: 96 % | RESPIRATION RATE: 18 BRPM | HEIGHT: 73 IN | DIASTOLIC BLOOD PRESSURE: 86 MMHG | SYSTOLIC BLOOD PRESSURE: 169 MMHG | TEMPERATURE: 98.4 F | BODY MASS INDEX: 27.57 KG/M2

## 2022-04-05 DIAGNOSIS — U07.1 COVID-19 VIRUS INFECTION: Primary | ICD-10-CM

## 2022-04-05 DIAGNOSIS — R42 LIGHTHEADEDNESS: ICD-10-CM

## 2022-04-05 LAB
ALBUMIN SERPL BCP-MCNC: 3.5 G/DL (ref 3.5–5)
ALP SERPL-CCNC: 49 U/L (ref 46–116)
ALT SERPL W P-5'-P-CCNC: 50 U/L (ref 12–78)
ANION GAP SERPL CALCULATED.3IONS-SCNC: 8 MMOL/L (ref 4–13)
AST SERPL W P-5'-P-CCNC: 35 U/L (ref 5–45)
BASOPHILS # BLD AUTO: 0.01 THOUSANDS/ΜL (ref 0–0.1)
BASOPHILS NFR BLD AUTO: 0 % (ref 0–1)
BILIRUB SERPL-MCNC: 0.41 MG/DL (ref 0.2–1)
BUN SERPL-MCNC: 29 MG/DL (ref 5–25)
CALCIUM SERPL-MCNC: 8.5 MG/DL (ref 8.3–10.1)
CARDIAC TROPONIN I PNL SERPL HS: 4 NG/L
CHLORIDE SERPL-SCNC: 104 MMOL/L (ref 100–108)
CO2 SERPL-SCNC: 25 MMOL/L (ref 21–32)
CREAT SERPL-MCNC: 1.38 MG/DL (ref 0.6–1.3)
EOSINOPHIL # BLD AUTO: 0.02 THOUSAND/ΜL (ref 0–0.61)
EOSINOPHIL NFR BLD AUTO: 1 % (ref 0–6)
ERYTHROCYTE [DISTWIDTH] IN BLOOD BY AUTOMATED COUNT: 13 % (ref 11.6–15.1)
FLUAV RNA RESP QL NAA+PROBE: NEGATIVE
FLUBV RNA RESP QL NAA+PROBE: NEGATIVE
GFR SERPL CREATININE-BSD FRML MDRD: 49 ML/MIN/1.73SQ M
GLUCOSE SERPL-MCNC: 96 MG/DL (ref 65–140)
HCT VFR BLD AUTO: 37.5 % (ref 36.5–49.3)
HGB BLD-MCNC: 13.2 G/DL (ref 12–17)
IMM GRANULOCYTES # BLD AUTO: 0.02 THOUSAND/UL (ref 0–0.2)
IMM GRANULOCYTES NFR BLD AUTO: 1 % (ref 0–2)
LACTATE SERPL-SCNC: 0.7 MMOL/L (ref 0.5–2)
LYMPHOCYTES # BLD AUTO: 1.15 THOUSANDS/ΜL (ref 0.6–4.47)
LYMPHOCYTES NFR BLD AUTO: 29 % (ref 14–44)
MAGNESIUM SERPL-MCNC: 2.1 MG/DL (ref 1.6–2.6)
MCH RBC QN AUTO: 31.7 PG (ref 26.8–34.3)
MCHC RBC AUTO-ENTMCNC: 35.2 G/DL (ref 31.4–37.4)
MCV RBC AUTO: 90 FL (ref 82–98)
MONOCYTES # BLD AUTO: 0.67 THOUSAND/ΜL (ref 0.17–1.22)
MONOCYTES NFR BLD AUTO: 17 % (ref 4–12)
NEUTROPHILS # BLD AUTO: 2.04 THOUSANDS/ΜL (ref 1.85–7.62)
NEUTS SEG NFR BLD AUTO: 52 % (ref 43–75)
NRBC BLD AUTO-RTO: 0 /100 WBCS
PLATELET # BLD AUTO: 111 THOUSANDS/UL (ref 149–390)
PMV BLD AUTO: 9.3 FL (ref 8.9–12.7)
POTASSIUM SERPL-SCNC: 3.8 MMOL/L (ref 3.5–5.3)
PROT SERPL-MCNC: 6.2 G/DL (ref 6.4–8.2)
RBC # BLD AUTO: 4.17 MILLION/UL (ref 3.88–5.62)
RSV RNA RESP QL NAA+PROBE: NEGATIVE
SARS-COV-2 RNA RESP QL NAA+PROBE: POSITIVE
SODIUM SERPL-SCNC: 137 MMOL/L (ref 136–145)
WBC # BLD AUTO: 3.91 THOUSAND/UL (ref 4.31–10.16)

## 2022-04-05 PROCEDURE — 70450 CT HEAD/BRAIN W/O DYE: CPT

## 2022-04-05 PROCEDURE — 99284 EMERGENCY DEPT VISIT MOD MDM: CPT

## 2022-04-05 PROCEDURE — 0241U HB NFCT DS VIR RESP RNA 4 TRGT: CPT | Performed by: PHYSICIAN ASSISTANT

## 2022-04-05 PROCEDURE — 83605 ASSAY OF LACTIC ACID: CPT | Performed by: PHYSICIAN ASSISTANT

## 2022-04-05 PROCEDURE — 96360 HYDRATION IV INFUSION INIT: CPT

## 2022-04-05 PROCEDURE — 85025 COMPLETE CBC W/AUTO DIFF WBC: CPT | Performed by: PHYSICIAN ASSISTANT

## 2022-04-05 PROCEDURE — 80053 COMPREHEN METABOLIC PANEL: CPT | Performed by: PHYSICIAN ASSISTANT

## 2022-04-05 PROCEDURE — 83735 ASSAY OF MAGNESIUM: CPT | Performed by: PHYSICIAN ASSISTANT

## 2022-04-05 PROCEDURE — 99285 EMERGENCY DEPT VISIT HI MDM: CPT | Performed by: PHYSICIAN ASSISTANT

## 2022-04-05 PROCEDURE — 84484 ASSAY OF TROPONIN QUANT: CPT | Performed by: PHYSICIAN ASSISTANT

## 2022-04-05 PROCEDURE — 36415 COLL VENOUS BLD VENIPUNCTURE: CPT | Performed by: PHYSICIAN ASSISTANT

## 2022-04-05 PROCEDURE — 93005 ELECTROCARDIOGRAM TRACING: CPT

## 2022-04-05 RX ORDER — ACETAMINOPHEN 325 MG/1
650 TABLET ORAL ONCE
Status: COMPLETED | OUTPATIENT
Start: 2022-04-05 | End: 2022-04-05

## 2022-04-05 RX ORDER — ALBUTEROL SULFATE 90 UG/1
2 AEROSOL, METERED RESPIRATORY (INHALATION) ONCE
Status: COMPLETED | OUTPATIENT
Start: 2022-04-05 | End: 2022-04-05

## 2022-04-05 RX ORDER — ASPIRIN 81 MG/1
81 TABLET ORAL DAILY
COMMUNITY

## 2022-04-05 RX ADMIN — SODIUM CHLORIDE 1000 ML: 0.9 INJECTION, SOLUTION INTRAVENOUS at 19:35

## 2022-04-05 RX ADMIN — ACETAMINOPHEN 325MG 650 MG: 325 TABLET ORAL at 19:37

## 2022-04-05 RX ADMIN — ALBUTEROL SULFATE 2 PUFF: 90 AEROSOL, METERED RESPIRATORY (INHALATION) at 21:06

## 2022-04-05 NOTE — ED PROVIDER NOTES
History  Chief Complaint   Patient presents with    Flu Symptoms     pt c/o dizziness, headache, intermittent fevers, body aches, chills, nausea, and diarrhea for past 4 days  pt taking tylenol with some relief  The patient is a 43-year-old male with a past medical history of hypertension who presents emergency department today with a chief complaint of lightheadedness, headache, intermittent fevers and chills body aches over last 3 days  Patient states that he is not vaccinating and COVID-19  He attributes this to possibly having the flu  Patient given today due to having spiking fevers of 101 today  He last took Tylenol around 5:00 a m  Chidi Riddle Patient also knows to have a constant waxing waning frontal headache  He states he does not have a history of migraines or persistent headaches  Patient also having loose stools today  He denies any abdominal pain, dysuria, nausea vomiting  He states that he has been feeling lightheaded today  He states that when he goes from sitting to standing he has been feeling lightheaded  Denies any blurred vision, syncope, neck pain        History provided by:  Patient   used: Yes    Flu Symptoms  Presenting symptoms: diarrhea, fatigue, headache and myalgias    Presenting symptoms: no cough, no fever, no nausea, no rhinorrhea, no shortness of breath, no sore throat and no vomiting    Diarrhea:     Quality:  Watery and semi-solid    Severity:  Moderate    Timing:  Constant    Progression:  Worsening  Fatigue:     Severity:  Moderate    Timing:  Constant    Progression:  Worsening  Headaches:     Severity:  Moderate    Onset quality:  Gradual    Timing:  Constant    Progression:  Worsening  Myalgias:     Location:  Generalized    Quality:  Aching    Severity:  Mild    Onset quality:  Gradual    Duration:  3 days    Timing:  Constant    Progression:  Worsening  Onset quality:  Gradual  Duration:  3 days  Progression:  Worsening  Relieved by: Nothing  Worsened by:  Nothing  Ineffective treatments:  Rest and OTC medications  Associated symptoms: chills    Associated symptoms: no decreased appetite, no decrease in physical activity, no ear pain, no mental status change, no congestion, no neck stiffness and no syncope    Risk factors: being elderly    Risk factors: no kidney disease and no liver disease        Prior to Admission Medications   Prescriptions Last Dose Informant Patient Reported? Taking? Ascorbic Acid (VITAMIN C) 100 MG tablet 4/5/2022 at Unknown time  Yes Yes   Sig: Take 3,000 mg by mouth daily   Multiple Vitamin (MULTIVITAMIN) capsule 4/5/2022 at Unknown time  Yes Yes   Sig: Take 1 capsule by mouth daily   amLODIPine (NORVASC) 5 mg tablet 4/5/2022 at Unknown time  No Yes   Sig: Take 1 tablet (5 mg total) by mouth daily   aspirin (ECOTRIN LOW STRENGTH) 81 mg EC tablet 4/5/2022 at Unknown time Self Yes Yes   Sig: Take 81 mg by mouth daily   atorvastatin (LIPITOR) 10 mg tablet 4/5/2022 at Unknown time  Yes Yes   Sig: Take 10 mg by mouth 2 (two) times a week Monday and Friday   co-enzyme Q-10 30 MG capsule 4/5/2022 at Unknown time  Yes Yes   Sig: Take 100 mg by mouth daily      Facility-Administered Medications: None       Past Medical History:   Diagnosis Date    Basal cell carcinoma of skin     multiple sites    Hypercholesterolemia     Skin cancer (melanoma) ([facility] [address] )     stage 0       Past Surgical History:   Procedure Laterality Date    ARM SKIN LESION BIOPSY / EXCISION         Family History   Problem Relation Age of Onset    Arthritis Mother      I have reviewed and agree with the history as documented      E-Cigarette/Vaping    E-Cigarette Use Never User      E-Cigarette/Vaping Substances     Social History     Tobacco Use    Smoking status: Former Smoker    Smokeless tobacco: Never Used   Vaping Use    Vaping Use: Never used   Substance Use Topics    Alcohol use: Not Currently    Drug use: Not Currently       Review of Systems Constitutional: Positive for chills and fatigue  Negative for decreased appetite and fever  HENT: Negative for congestion, ear pain, rhinorrhea and sore throat  Respiratory: Negative for cough and shortness of breath  Gastrointestinal: Positive for diarrhea  Negative for nausea and vomiting  Musculoskeletal: Positive for myalgias  Negative for neck stiffness  Neurological: Positive for headaches  All other systems reviewed and are negative  Physical Exam  Physical Exam  Vitals and nursing note reviewed  Constitutional:       Appearance: He is well-developed  HENT:      Head: Normocephalic and atraumatic  Nose: Nose normal       Mouth/Throat:      Mouth: Mucous membranes are dry  Eyes:      Extraocular Movements: Extraocular movements intact  Conjunctiva/sclera: Conjunctivae normal       Pupils: Pupils are equal, round, and reactive to light  Cardiovascular:      Rate and Rhythm: Normal rate and regular rhythm  Pulses: Normal pulses  Heart sounds: No murmur heard  Pulmonary:      Effort: Pulmonary effort is normal  No respiratory distress  Breath sounds: Normal breath sounds  Abdominal:      Palpations: Abdomen is soft  Tenderness: There is no abdominal tenderness  Musculoskeletal:      Cervical back: Normal range of motion and neck supple  Right lower leg: No edema  Left lower leg: No edema  Skin:     General: Skin is warm and dry  Capillary Refill: Capillary refill takes less than 2 seconds  Neurological:      General: No focal deficit present  Mental Status: He is alert and oriented to person, place, and time        Gait: Gait normal          Vital Signs  ED Triage Vitals [04/05/22 1823]   Temperature Pulse Respirations Blood Pressure SpO2   98 4 °F (36 9 °C) 90 17 133/93 97 %      Temp Source Heart Rate Source Patient Position - Orthostatic VS BP Location FiO2 (%)   Oral Monitor Sitting Right arm --      Pain Score       8 Vitals:    04/05/22 1823 04/05/22 1940 04/05/22 2030   BP: 133/93 137/84 169/86   Pulse: 90 72 74   Patient Position - Orthostatic VS: Sitting Lying Lying         Visual Acuity      ED Medications  Medications   sodium chloride 0 9 % bolus 1,000 mL (0 mL Intravenous Stopped 4/5/22 2035)   acetaminophen (TYLENOL) tablet 650 mg (650 mg Oral Given 4/5/22 1937)   albuterol (PROVENTIL HFA,VENTOLIN HFA) inhaler 2 puff (2 puffs Inhalation Given 4/5/22 2106)       Diagnostic Studies  Results Reviewed     Procedure Component Value Units Date/Time    Lactic acid [[id number]]  (Normal) Collected: 04/05/22 1932    Lab Status: Final result Specimen: Blood from Line, Venous Updated: 04/05/22 2029     LACTIC ACID 0 7 mmol/L     Narrative:      Result may be elevated if tourniquet was used during collection  COVID/FLU/RSV - 2 hour TAT [[id number]]  (Abnormal) Collected: 04/05/22 1933    Lab Status: Final result Specimen: Nares from Nose Updated: 04/05/22 2024     SARS-CoV-2 Positive     INFLUENZA A PCR Negative     INFLUENZA B PCR Negative     RSV PCR Negative    Narrative:      FOR PEDIATRIC PATIENTS - copy/paste COVID Guidelines URL to browser: [email or web address] org/  [employer]    SARS-CoV-2 assay is a Nucleic Acid Amplification assay intended for the  qualitative detection of nucleic acid from SARS-CoV-2 in nasopharyngeal  swabs  Results are for the presumptive identification of SARS-CoV-2 RNA  Positive results are indicative of infection with SARS-CoV-2, the virus  causing COVID-19, but do not rule out bacterial infection or co-infection  with other viruses  Laboratories within the [place] and its  territories are required to report all positive results to the appropriate  public health authorities  Negative results do not preclude SARS-CoV-2  infection and should not be used as the sole basis for treatment or other  patient management decisions   Negative results must be combined with  clinical observations, patient history, and epidemiological information  This test has not been FDA cleared or approved  This test has been authorized by FDA under an Emergency Use Authorization  (EUA)  This test is only authorized for the duration of time the  declaration that circumstances exist justifying the authorization of the  emergency use of an in vitro diagnostic tests for detection of SARS-CoV-2  virus and/or diagnosis of COVID-19 infection under section 564(b)(1) of  the Act, 21 U  S C  854QAM-0(X)(3), unless the authorization is terminated  or revoked sooner  The test has been validated but independent review by FDA  and CLIA is pending  Test performed using [employer] GeneXpert: This RT-PCR assay targets N2,  a region unique to SARS-CoV-2  A conserved region in the E-gene was chosen  for pan-Sarbecovirus detection which includes SARS-CoV-2      HS Troponin 0hr (reflex protocol) [[id number]]  (Normal) Collected: 04/05/22 1932    Lab Status: Final result Specimen: Blood from Line, Venous Updated: 04/05/22 2011     hs TnI 0hr 4 ng/L     Comprehensive metabolic panel [[id number]]  (Abnormal) Collected: 04/05/22 1932    Lab Status: Final result Specimen: Blood from Line, Venous Updated: 04/05/22 2002     Sodium 137 mmol/L      Potassium 3 8 mmol/L      Chloride 104 mmol/L      CO2 25 mmol/L      ANION GAP 8 mmol/L      BUN 29 mg/dL      Creatinine 1 38 mg/dL      Glucose 96 mg/dL      Calcium 8 5 mg/dL      AST 35 U/L      ALT 50 U/L      Alkaline Phosphatase 49 U/L      Total Protein 6 2 g/dL      Albumin 3 5 g/dL      Total Bilirubin 0 41 mg/dL      eGFR 49 ml/min/1 73sq m     Narrative:      [place] guidelines for Chronic Kidney Disease (CKD):     Stage 1 with normal or high GFR (GFR > 90 mL/min/1 73 square meters)    Stage 2 Mild CKD (GFR = 60-89 mL/min/1 73 square meters)    Stage 3A Moderate CKD (GFR = 45-59 mL/min/1 73 square meters)    Stage 3B Moderate CKD (GFR = 30-44 mL/min/1 73 square meters)    Stage 4 Severe CKD (GFR = 15-29 mL/min/1 73 square meters)    Stage 5 End Stage CKD (GFR <15 mL/min/1 73 square meters)  Note: GFR calculation is accurate only with a steady state creatinine    Magnesium [[id number]]  (Normal) Collected: 04/05/22 1932    Lab Status: Final result Specimen: Blood from Line, Venous Updated: 04/05/22 2002     Magnesium 2 1 mg/dL     CBC and differential [[id number]]  (Abnormal) Collected: 04/05/22 1932    Lab Status: Final result Specimen: Blood from Line, Venous Updated: 04/1946     WBC 3 91 Thousand/uL      RBC 4 17 Million/uL      Hemoglobin 13 2 g/dL      Hematocrit 37 5 %      MCV 90 fL      MCH 31 7 pg      MCHC 35 2 g/dL      RDW 13 0 %      MPV 9 3 fL      Platelets 994 Thousands/uL      nRBC 0 /100 WBCs      Neutrophils Relative 52 %      Immat GRANS % 1 %      Lymphocytes Relative 29 %      Monocytes Relative 17 %      Eosinophils Relative 1 %      Basophils Relative 0 %      Neutrophils Absolute 2 04 Thousands/µL      Immature Grans Absolute 0 02 Thousand/uL      Lymphocytes Absolute 1 15 Thousands/µL      Monocytes Absolute 0 67 Thousand/µL      Eosinophils Absolute 0 02 Thousand/µL      Basophils Absolute 0 01 Thousands/µL                  CT head without contrast   Final Result by Hawa Curry DO (04/05 1923)      No acute intracranial abnormality                    Workstation performed: [id number]                    Procedures  ECG 12 Lead Documentation Only    Date/Time: 4/5/2022 9:34 PM  Performed by: Nain Villegas PA-C  Authorized by: Nain Villegas PA-C     Indications / Diagnosis:  Lightheadedness  ECG reviewed by me, the ED Provider: yes    Patient location:  ED  Previous ECG:     Previous ECG:  Unavailable  Interpretation:     Interpretation: non-specific    Rate:     ECG rate:  79    ECG rate assessment: normal    Rhythm:     Rhythm: sinus rhythm    Conduction:     Conduction: normal    ST segments:     ST segments:  Normal  T waves:     T waves: normal               ED Course  ED Course as of 04/05/22 2137 Tue Apr 05, 2022 2039 SARS-COV-2(!): Positive                               SBIRT 22yo+      Most Recent Value   SBIRT (25 yo +)    In order to provide better care to our patients, we are screening all of our patients for alcohol and drug use  Would it be okay to ask you these screening questions? Yes Filed at: 04/05/2022 2052   Initial Alcohol Screen: US AUDIT-C     1  How often do you have a drink containing alcohol? 2 Filed at: 04/05/2022 2052   2  How many drinks containing alcohol do you have on a typical day you are drinking? 0 Filed at: 04/05/2022 2052   3a  Male UNDER 65: How often do you have five or more drinks on one occasion? 0 Filed at: 04/05/2022 2052   3b  FEMALE Any Age, or MALE 65+: How often do you have 4 or more drinks on one occassion? 0 Filed at: 04/05/2022 2052   Audit-C Score 2 Filed at: 04/05/2022 2052   CARIN: How many times in the past year have you    Used an illegal drug or used a prescription medication for non-medical reasons? Never Filed at: 04/05/2022 2052                    MDM  Number of Diagnoses or Management Options  COVID-19 virus infection  Orthostatic hypotension  Diagnosis management comments: Patient's patient's lightheadedness was positional   Patient abnormalities on lab work  Patient is unvaccinated COVID-19  Patient did test positive for COVID-19 however was not short of breath not having cough no hypoxia  Patient was given pulse oximeter to monitor himself at home  Patient was given albuterol inhaler  Did give information for infusion center information  Patient expressed understanding was told to return if anything worsens expressed understanding was in agreement treatment plan  Patient did ambulate to the bathroom after receiving IV fluids and resolution of lightheadedness         Amount and/or Complexity of Data Reviewed  Clinical lab tests: ordered and reviewed  Tests in the radiology section of CPT®: ordered and reviewed  Decide to obtain previous medical records or to obtain history from someone other than the patient: yes  Review and summarize past medical records: yes  Independent visualization of images, tracings, or specimens: yes    Risk of Complications, Morbidity, and/or Mortality  Presenting problems: moderate  Diagnostic procedures: moderate  Management options: moderate    Patient Progress  Patient progress: improved      Disposition  Final diagnoses:   COVID-19 virus infection   Lightheadedness     Time reflects when diagnosis was documented in both MDM as applicable and the Disposition within this note     Time User Action Codes Description Comment    4/5/2022  8:52 PM Marla China Add [U07 1] COVID-19 virus infection     4/5/2022  8:52 PM Marla China Add [I95 1] Orthostatic hypotension     4/5/2022  9:37 PM Marla China Add [R42] Lightheadedness     4/5/2022  9:37 PM Marla [place] Remove [I95 1] Orthostatic hypotension       ED Disposition     ED Disposition Condition Date/Time Comment    Discharge Stable Tue Apr 5, 2022  8:51 PM Guilherme Choudhary discharge to home/self care              Follow-up Information     Follow up With Specialties Details Why Contact Info Additional Information    Deja Villalpando MD Internal Medicine Call  As needed [address] [place] [phone]       [address] [facility] Schedule an appointment as soon as possible for a visit   [address] [place] [address], [address] [facility]          Discharge Medication List as of 4/5/2022  9:01 PM      CONTINUE these medications which have NOT CHANGED    Details   amLODIPine (NORVASC) 5 mg tablet Take 1 tablet (5 mg total) by mouth daily, Starting Wed 7/29/2020, Print      Ascorbic Acid (VITAMIN C) 100 MG tablet Take 3,000 mg by mouth daily, Historical Med      aspirin (ECOTRIN LOW STRENGTH) 81 mg EC tablet Take 81 mg by mouth daily, Historical Med      atorvastatin (LIPITOR) 10 mg tablet Take 10 mg by mouth 2 (two) times a week Monday and Friday, Historical Med      co-enzyme Q-10 30 MG capsule Take 100 mg by mouth daily, Historical Med      Multiple Vitamin (MULTIVITAMIN) capsule Take 1 capsule by mouth daily, Historical Med             No discharge procedures on file      PDMP Review     None          ED Provider  Electronically Signed by           Melo Zhou PA-C  04/05/22 4800

## 2022-04-06 ENCOUNTER — NURSE TRIAGE (OUTPATIENT)
Dept: OTHER | Facility: OTHER | Age: 77
End: 2022-04-06

## 2022-04-06 LAB
ATRIAL RATE: 79 BPM
P AXIS: 69 DEGREES
PR INTERVAL: 180 MS
QRS AXIS: -34 DEGREES
QRSD INTERVAL: 96 MS
QT INTERVAL: 388 MS
QTC INTERVAL: 444 MS
T WAVE AXIS: 59 DEGREES
VENTRICULAR RATE: 79 BPM

## 2022-04-06 NOTE — TELEPHONE ENCOUNTER
Patient stated that was interested in [address] but he does not currently have any symptoms  He was provided home care instructions and advised to call back if he develops any symptoms so we can set up a virtual appointment with a [facility] provider       Reason for Disposition   [1] COVID-19 diagnosed by positive lab test (e g , PCR, rapid self-test kit) AND [2] NO symptoms (e g , cough, fever, others)    Protocols used: CORONAVIRUS (COVID-19) DIAGNOSED OR SUSPECTED-ADULT-OH

## 2022-04-06 NOTE — TELEPHONE ENCOUNTER
Regarding: MAB questions  ----- Message from Mady Vargas sent at 4/6/2022 12:28 PM EDT -----  "I was recently diagnosed with covid and I would like some info on the MAB infusion "

## 2022-04-06 NOTE — DISCHARGE INSTRUCTIONS
Please quarantine for a total of 10 days from the onset of symptoms   Elisa follow up with your primary and watch your oxygen levels, if they drop below 90% then you must return

## 2023-06-04 ENCOUNTER — HOSPITAL ENCOUNTER (EMERGENCY)
Facility: HOSPITAL | Age: 78
Discharge: HOME | End: 2023-06-04
Attending: EMERGENCY MEDICINE
Payer: MEDICARE

## 2023-06-04 ENCOUNTER — APPOINTMENT (EMERGENCY)
Dept: RADIOLOGY | Facility: HOSPITAL | Age: 78
End: 2023-06-04
Payer: MEDICARE

## 2023-06-04 VITALS
TEMPERATURE: 97.3 F | BODY MASS INDEX: 27.57 KG/M2 | HEIGHT: 73 IN | OXYGEN SATURATION: 98 % | SYSTOLIC BLOOD PRESSURE: 155 MMHG | RESPIRATION RATE: 17 BRPM | WEIGHT: 208 LBS | HEART RATE: 81 BPM | DIASTOLIC BLOOD PRESSURE: 86 MMHG

## 2023-06-04 DIAGNOSIS — M79.662 PAIN OF LEFT CALF: Primary | ICD-10-CM

## 2023-06-04 PROCEDURE — 73590 X-RAY EXAM OF LOWER LEG: CPT

## 2023-06-04 PROCEDURE — 73610 X-RAY EXAM OF ANKLE: CPT

## 2023-06-04 PROCEDURE — 99283 EMERGENCY DEPT VISIT LOW MDM: CPT

## 2023-06-04 NOTE — DISCHARGE INSTRUCTIONS
I believe that you may have injured your gastrocnemius muscle  Please use the walking boot to ambulate  And follow-up with the orthopedic as discussed

## 2023-06-04 NOTE — ED PROVIDER NOTES
History  Chief Complaint   Patient presents with   • Leg Injury     Leg leg calf pt heard a tear      Patient is a 67-year-old male who presents emerged part today with a complaint of left calf pain since yesterday  The patient states that yesterday he was pushing a vehicle in his garage  States it would not start  He states that when he went to push he felt a tear/pop in his left calf and pain  Patient is able to stand on his toes but when he waxes his foot he has severe pain in his left calf  Patient has taken Advil without relief  No other injuries         Leg Pain  Location:  Leg  Time since incident:  2 days  Injury: yes    Leg location:  L lower leg  Pain details:     Quality:  Throbbing    Severity:  Moderate    Onset quality:  Sudden    Duration:  2 days    Timing:  Constant    Progression:  Unchanged  Chronicity:  New  Dislocation: no    Worsened by:  Bearing weight and flexion  Ineffective treatments:  Acetaminophen, rest and muscle relaxant  Associated symptoms: no back pain, no decreased ROM, no fever, no muscle weakness and no numbness        Prior to Admission Medications   Prescriptions Last Dose Informant Patient Reported? Taking?    Ascorbic Acid (VITAMIN C) 100 MG tablet   Yes No   Sig: Take 3,000 mg by mouth daily   Multiple Vitamin (MULTIVITAMIN) capsule   Yes No   Sig: Take 1 capsule by mouth daily   amLODIPine (NORVASC) 5 mg tablet   No No   Sig: Take 1 tablet (5 mg total) by mouth daily   aspirin (ECOTRIN LOW STRENGTH) 81 mg EC tablet  Self Yes No   Sig: Take 81 mg by mouth daily   atorvastatin (LIPITOR) 10 mg tablet   Yes No   Sig: Take 10 mg by mouth 2 (two) times a week Monday and Friday   co-enzyme Q-10 30 MG capsule   Yes No   Sig: Take 100 mg by mouth daily      Facility-Administered Medications: None       Past Medical History:   Diagnosis Date   • Basal cell carcinoma of skin     multiple sites   • Hypercholesterolemia    • Skin cancer (melanoma) (HCC)     stage 0       Past Surgical History:   Procedure Laterality Date   • ARM SKIN LESION BIOPSY / EXCISION         Family History   Problem Relation Age of Onset   • Arthritis Mother      I have reviewed and agree with the history as documented  E-Cigarette/Vaping   • E-Cigarette Use Never User      E-Cigarette/Vaping Substances     Social History     Tobacco Use   • Smoking status: Former   • Smokeless tobacco: Never   Vaping Use   • Vaping Use: Never used   Substance Use Topics   • Alcohol use: Not Currently   • Drug use: Not Currently       Review of Systems   Constitutional: Negative for chills and fever  HENT: Negative for ear pain  Eyes: Negative for pain and visual disturbance  Respiratory: Negative for shortness of breath and stridor  Cardiovascular: Negative for chest pain and palpitations  Gastrointestinal: Negative for abdominal pain, nausea and vomiting  Genitourinary: Negative for dysuria and hematuria  Musculoskeletal: Negative for arthralgias and back pain  Skin: Negative for color change and rash  Neurological: Negative for seizures and speech difficulty  All other systems reviewed and are negative  Physical Exam  Physical Exam  Vitals and nursing note reviewed  Constitutional:       General: He is not in acute distress  Appearance: He is well-developed  HENT:      Head: Normocephalic and atraumatic  Eyes:      Conjunctiva/sclera: Conjunctivae normal    Cardiovascular:      Rate and Rhythm: Normal rate and regular rhythm  Heart sounds: No murmur heard  Pulmonary:      Effort: Pulmonary effort is normal  No respiratory distress  Breath sounds: Normal breath sounds  Abdominal:      Palpations: Abdomen is soft  Tenderness: There is no abdominal tenderness  Musculoskeletal:         General: No swelling  Cervical back: Neck supple  Left knee: Normal       Left lower leg: Tenderness present        Left ankle:      Left Achilles Tendon: Normal       Comments: Patient had tenderness to palpation over the left calf  The patient was able to extend and flex the left foot however patient having pain with dorsiflexion     Achilles tendon intact  Skin:     General: Skin is warm and dry  Capillary Refill: Capillary refill takes less than 2 seconds  Neurological:      General: No focal deficit present  Mental Status: He is alert and oriented to person, place, and time  Psychiatric:         Mood and Affect: Mood normal          Vital Signs  ED Triage Vitals [06/04/23 1127]   Temperature Pulse Respirations Blood Pressure SpO2   (!) 97 3 °F (36 3 °C) 81 17 155/86 98 %      Temp src Heart Rate Source Patient Position - Orthostatic VS BP Location FiO2 (%)   -- -- -- Left arm --      Pain Score       10 - Worst Possible Pain           Vitals:    06/04/23 1127   BP: 155/86   Pulse: 81         Visual Acuity      ED Medications  Medications - No data to display    Diagnostic Studies  Results Reviewed     None                 XR tibia fibula 2 views LEFT   ED Interpretation by Mabel Dash PA-C (06/04 1226)   No acute fracture      XR ankle 3+ views LEFT   ED Interpretation by Mabel Dash PA-C (06/04 1226)   No acute fracture                 Procedures  Procedures         ED Course                               SBIRT 20yo+    Flowsheet Row Most Recent Value   Initial Alcohol Screen: US AUDIT-C     1  How often do you have a drink containing alcohol? 0 Filed at: 06/04/2023 1129   2  How many drinks containing alcohol do you have on a typical day you are drinking? 0 Filed at: 06/04/2023 1129   3a  Male UNDER 65: How often do you have five or more drinks on one occasion? 0 Filed at: 06/04/2023 1129   Audit-C Score 0 Filed at: 06/04/2023 1129   CARIN: How many times in the past year have you    Used an illegal drug or used a prescription medication for non-medical reasons?  Never Filed at: 06/04/2023 1129                    Medical Decision Making  Patient is a 67-year-old male who presents emerged part today with a complaint of left calf pain since yesterday  The patient states that yesterday he was pushing a vehicle in his garage  States it would not start  He states that when he went to push he felt a tear/pop in his left calf and pain  Patient is able to stand on his toes but when he waxes his foot he has severe pain in his left calf  Patient has taken Advil without relief  No other injuries        the x-ray revealed no acute fracture  Patient on physical examination was complaining of left calf pain and reproducible  Worse with certain movements of the left foot  Most likely secondary to a calf injury, concern at this time for an injury to the gastrocnemius of the left lower extremity  Patient was given a walking boot for symptomatic relief as well as offered pain medication but declined  Referral placed to orthopedics and expressed understanding was agreement treatment plan  Amount and/or Complexity of Data Reviewed  Radiology: ordered and independent interpretation performed  Decision-making details documented in ED Course  Disposition  Final diagnoses:   Pain of left calf     Time reflects when diagnosis was documented in both MDM as applicable and the Disposition within this note     Time User Action Codes Description Comment    6/4/2023 12:26 PM Fili Saavedra Add [I79 733] Pain of left calf       ED Disposition     ED Disposition   Discharge    Condition   Stable    Date/Time   Sun Jun 4, 2023 12:26 PM    Comment   Oseas Dudley discharge to home/self care                 Follow-up Information     Follow up With Specialties Details Why [address] [facility] Orth[facility] Surgery Schedule an appointment as soon as possible for a visit  If symptoms worsen [address] [facility]  [phone]            Discharge Medication List as of 6/4/2023 12:29 PM      CONTINUE these medications which have NOT CHANGED    Details   amLODIPine (NORVASC) 5 mg tablet Take 1 tablet (5 mg total) by mouth daily, Starting Wed 7/29/2020, Print      Ascorbic Acid (VITAMIN C) 100 MG tablet Take 3,000 mg by mouth daily, Historical Med      aspirin (ECOTRIN LOW STRENGTH) 81 mg EC tablet Take 81 mg by mouth daily, Historical Med      atorvastatin (LIPITOR) 10 mg tablet Take 10 mg by mouth 2 (two) times a week Monday and Friday, Historical Med      co-enzyme Q-10 30 MG capsule Take 100 mg by mouth daily, Historical Med      Multiple Vitamin (MULTIVITAMIN) capsule Take 1 capsule by mouth daily, Historical Med                 PDMP Review     None          ED Provider  Electronically Signed by           Isreal Ennis PA-C  06/04/23 1108

## 2024-01-31 ENCOUNTER — OFFICE VISIT (OUTPATIENT)
Dept: URGENT CARE | Facility: CLINIC | Age: 79
End: 2024-01-31
Payer: MEDICARE

## 2024-01-31 VITALS
DIASTOLIC BLOOD PRESSURE: 70 MMHG | WEIGHT: 202 LBS | OXYGEN SATURATION: 99 % | BODY MASS INDEX: 26.77 KG/M2 | RESPIRATION RATE: 16 BRPM | TEMPERATURE: 96.8 F | SYSTOLIC BLOOD PRESSURE: 140 MMHG | HEIGHT: 73 IN | HEART RATE: 67 BPM

## 2024-01-31 DIAGNOSIS — I80.9 PHLEBITIS: Primary | ICD-10-CM

## 2024-01-31 PROCEDURE — G0463 HOSPITAL OUTPT CLINIC VISIT: HCPCS

## 2024-01-31 PROCEDURE — 99203 OFFICE O/P NEW LOW 30 MIN: CPT

## 2024-01-31 NOTE — PATIENT INSTRUCTIONS
Tylenol as needed  Heating pad over area  If any worsening or worrisome symptoms, proceed to ER  Follow up with PCP in 3-5 days.  Proceed to  ER if symptoms worsen.

## 2024-01-31 NOTE — PROGRESS NOTES
[facility]        NAME: Andrea Clark is a 79 y.o. male  : 1945    MRN: [id number]  DATE: 2024  TIME: 9:06 AM    Assessment and Plan   Phlebitis [I80.9]  1. Phlebitis          Symptoms and presentation consistent with phlebitis versus superficial thrombophlebitis.  Patient is already on daily aspirin.  Advised to apply warm compress to area as well as continuing to take aspirin.  Patient has good pulses and full sensation of hand so deferred ER visit at this time.  Advised if anything worsens or any worrisome symptoms that arise as discussed to proceed to ER.  Patient verbalized understanding.    Patient Instructions     Tylenol as needed  Heating pad over area  If any worsening or worrisome symptoms, proceed to ER  Follow up with PCP in 3-5 days.  Proceed to  ER if symptoms worsen.    Chief Complaint     Chief Complaint   Patient presents with    Arm Pain     C/o right arm pain with palpation , redness, and slight swelling. Reports developed after having Needle inserted for MRI with contrast on 24. Onset 24 of redness and sweling.         History of Present Illness       Patient is presenting for right forearm pain with palpation, bruising, and swelling x 2 days.  He had an MRI with contrast that he had a needle inserted at that site on 2024.  About 2 days ago was when he developed symptoms and the vein became hard and tender.  He denies any tingling, numbness, loss of sensation, or any change of color of his hand or forearm.  He stated he is on aspirin already and has a history of a blood clot at his right lung. He denies any CP or SOB.       Arm Pain         Review of Systems   Review of Systems   Constitutional: Negative.    Respiratory: Negative.     Cardiovascular: Negative.    Musculoskeletal:         Slight swelling at R forearm with tenderness   Skin:  Positive for color change (bruising at R forearm where needle was inserted).         Current Medications  "      Current Outpatient Medications:     amLODIPine (NORVASC) 5 mg tablet, Take 1 tablet (5 mg total) by mouth daily, Disp: 30 tablet, Rfl: 0    Ascorbic Acid (VITAMIN C) 100 MG tablet, Take 3,000 mg by mouth daily, Disp: , Rfl:     aspirin (ECOTRIN LOW STRENGTH) 81 mg EC tablet, Take 81 mg by mouth daily, Disp: , Rfl:     atorvastatin (LIPITOR) 10 mg tablet, Take 10 mg by mouth 2 (two) times a week Monday and Friday, Disp: , Rfl:     co-enzyme Q-10 30 MG capsule, Take 100 mg by mouth daily, Disp: , Rfl:     Multiple Vitamin (MULTIVITAMIN) capsule, Take 1 capsule by mouth daily, Disp: , Rfl:     Current Allergies     Allergies as of 01/31/2024 - Reviewed 01/31/2024   Allergen Reaction Noted    Levofloxacin Other (See Comments) 04/05/2022    Lisinopril Hives 01/04/2016    Penicillins Other (See Comments) 07/27/2020            The following portions of the patient's history were reviewed and updated as appropriate: allergies, current medications, past family history, past medical history, past social history, past surgical history and problem list.     Past Medical History:   Diagnosis Date    Basal cell carcinoma of skin     multiple sites    Hypercholesterolemia     Hypertension     Skin cancer (melanoma) (HCC)     stage 0       Past Surgical History:   Procedure Laterality Date    ARM SKIN LESION BIOPSY / EXCISION         Family History   Problem Relation Age of Onset    Arthritis Mother          Medications have been verified.        Objective   /70   Pulse 67   Temp (!) 96.8 °F (36 °C)   Resp 16   Ht 6' 1\" (1.854 m)   Wt 91.6 kg (202 lb)   SpO2 99%   BMI 26.65 kg/m²        Physical Exam     Physical Exam  Constitutional:       Appearance: Normal appearance.   Cardiovascular:      Rate and Rhythm: Normal rate and regular rhythm.      Pulses: Normal pulses.      Heart sounds: Normal heart sounds.   Pulmonary:      Effort: Pulmonary effort is normal.      Breath sounds: Normal breath sounds. "   Musculoskeletal:         General: Swelling and tenderness (vein firm to palpation at site) present. Normal range of motion.   Skin:     General: Skin is warm and dry.      Capillary Refill: Capillary refill takes less than 2 seconds.      Findings: Bruising present.      Comments: Full sensation of hand and good radial pulses   Neurological:      General: No focal deficit present.      Mental Status: He is alert and oriented to person, place, and time. Mental status is at baseline.

## 2024-02-20 ENCOUNTER — HOSPITAL ENCOUNTER (OUTPATIENT)
Dept: RADIOLOGY | Facility: HOSPITAL | Age: 79
Discharge: HOME | End: 2024-02-20
Payer: MEDICARE

## 2024-02-20 DIAGNOSIS — M25.552 PAIN IN JOINT OF LEFT HIP: ICD-10-CM

## 2024-02-20 PROCEDURE — 73502 X-RAY EXAM HIP UNI 2-3 VIEWS: CPT

## 2024-03-04 ENCOUNTER — HOSPITAL ENCOUNTER (OUTPATIENT)
Dept: RADIOLOGY | Facility: HOSPITAL | Age: 79
Discharge: HOME | End: 2024-03-04
Payer: MEDICARE

## 2024-03-04 DIAGNOSIS — M25.551 PAIN IN RIGHT HIP: ICD-10-CM

## 2024-03-04 DIAGNOSIS — M25.552 PAIN IN LEFT HIP: ICD-10-CM
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
## 2024-03-04 PROCEDURE — 73521 X-RAY EXAM HIPS BI 2 VIEWS: CPT

## 2024-03-29 ENCOUNTER — HOSPITAL ENCOUNTER (OUTPATIENT)
Dept: RADIOLOGY | Facility: HOSPITAL | Age: 79
End: 2024-03-29
Payer: MEDICARE

## 2024-03-29 DIAGNOSIS — M25.551 RIGHT HIP PAIN: ICD-10-CM

## 2024-03-29 PROCEDURE — 73502 X-RAY EXAM HIP UNI 2-3 VIEWS: CPT

## 2024-04-18 ENCOUNTER — HOSPITAL ENCOUNTER (OUTPATIENT)
Dept: MRI IMAGING | Facility: HOSPITAL | Age: 79
Discharge: HOME | End: 2024-04-18
Payer: MEDICARE

## 2024-04-18 DIAGNOSIS — M25.551 RIGHT HIP PAIN: ICD-10-CM

## 2024-04-18 DIAGNOSIS — M54.31 SCIATICA, RIGHT SIDE: ICD-10-CM

## 2024-04-18 PROCEDURE — 72148 MRI LUMBAR SPINE W/O DYE: CPT

## 2024-04-18 PROCEDURE — 73721 MRI JNT OF LWR EXTRE W/O DYE: CPT

## 2024-05-20 ENCOUNTER — EVALUATION (OUTPATIENT)
Dept: PHYSICAL THERAPY | Facility: CLINIC | Age: 79
End: 2024-05-20
Payer: MEDICARE

## 2024-05-20 DIAGNOSIS — M25.551 RIGHT HIP PAIN: Primary | ICD-10-CM

## 2024-05-20 PROCEDURE — 97161 PT EVAL LOW COMPLEX 20 MIN: CPT | Performed by: PHYSICAL THERAPIST

## 2024-05-20 PROCEDURE — 97110 THERAPEUTIC EXERCISES: CPT | Performed by: PHYSICAL THERAPIST

## 2024-05-20 NOTE — LETTER
May 22, 2024    Taylor Watson MD  [address] [facility] [address]    Patient: Andrea Clark   YOB: 1945   Date of Visit: 2024     Encounter Diagnosis     ICD-10-CM    1. Right hip pain  M25.551           Dear Dr. Watson:    Thank you for your recent referral of Andrea Clark. Please review the attached evaluation summary from Andrea's recent visit.     Please verify that you agree with the plan of care by signing the attached order.     If you have any questions or concerns, please do not hesitate to call.     I sincerely appreciate the opportunity to share in the care of one of your patients and hope to have another opportunity to work with you in the near future.       Sincerely,    Charlotte Cunningham, PT      Referring Provider:      I certify that I have read the below Plan of Care and certify the need for these services furnished under this plan of treatment while under my care.                    Taylor Watson MD  [address] [facility] [address]  Via Fax: [phone]          PT Evaluation     Today's date: 2024  Patient name: Andrea Clark  : 1945  MRN: [id number]  Referring provider: Taylor Watson MD  Dx:   Encounter Diagnosis     ICD-10-CM    1. Right hip pain  M25.551                      Assessment  Impairments: lacks appropriate home exercise program and pain with function  Symptom irritability: moderate  Barriers to therapy: Patient 79 y.o. male who presents to PT this date with a recent history of fall resultin gin right gluteus medius tear. He has difficulty with ambulation due to pain in the lateral right hip. He is responding well to a recent injection with decreased pain. He does demonstrate weakness and painful ROM of the right hip. Patient is expected to respond well to PT intervention to increase strength and increase tolerance to walking.     Plan  Patient would benefit from: PT eval and skilled physical therapy  Planned modality  interventions: TENS, electrical stimulation/Russian stimulation, cryotherapy and thermotherapy: hydrocollator packs    Planned therapy interventions: manual therapy, neuromuscular re-education, therapeutic activities, therapeutic exercise and home exercise program    Frequency: 2x week  Duration in weeks: 8  Plan of Care beginning date: 2024  Plan of Care expiration date: 2024  Treatment plan discussed with: patient  Plan details: Patient's evaluation is completed. Patient was instructed with a HEP this date. Patient has scheduled further PT sessions for treatment.          Subjective Evaluation    History of Present Illness  Mechanism of injury: MRI findings - Diffuse lumbar spondylitic degenerative change with annular bulging, small disc herniations and endplate/facet hypertrophic changes. Multilevel mild canal stenosis. There is more pronounced right-sided foraminal narrowing and lateral recess stenosis at   L4-5 with compression of both the L4 and L5 nerves. At L5-S1 there is annular bulging with circumferential endplate hypertrophic change resulting in mild compression of both L5 nerves within the neural foramen.    Patient notes that fell on ice (24) resulting in a tear of the glut med. Patient notes he did have a injection which has been helpful. 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 He notes difficulty with standing to work on his work bench or walking any distance. He notes that walking is painful and difficult. 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    Patient Goals  Patient goals for therapy: increased strength and decreased pain    Pain  Current pain ratin  At worst pain rating: 10 (walking)  Quality: needle-like    Social Support  Lives in: multiple-level home    Employment status: working    Diagnostic Tests  MRI studies: abnormal (see subjective)      Objective     Palpation     Right   Tenderness of the gluteus medius.  "    Neurological Testing     Sensation     Hip   Left Hip   Intact: light touch    Right Hip   Intact: light touch    Active Range of Motion   Left Hip   Normal active range of motion    Right Hip   Flexion: 100 degrees   Abduction: 20 degrees with pain  External rotation (90/90): 30 degrees with pain  Internal rotation (90/90): 15 degrees with pain    Strength/Myotome Testing     Left Hip   Planes of Motion   Flexion: 4+  Extension: 4+  Abduction: 4+  Adduction: 4+  External rotation: 4+  Internal rotation: 4+    Right Hip   Planes of Motion   Flexion: 4  Extension: 4  Abduction: 3  Adduction: 4  External rotation: 3+  Internal rotation: 3+    Tests     Additional Tests Details  Mild R gluteus medius lurch - non-compensated              Precautions: None     Daily Treatment Diary:      Initial Evaluation Date: 05/20/24  Compliance 5/20                     Visit Number 1                    Re-Eval  IE                 MC   Foto Captured Y                           5/20                     Manual                                                                                        Ther-Ex                      LTR                      PPT x10                     H/L Add 5\" x10                     H/L clamshells [place] x10                     bridges                      SLR                      Supine hip abd                      Standing hip abd/ ext             Mini squats             Lateral step up                      Forward step up                      Nu-step                      Neuro Re-Ed                      Side step                      Slow march             Tight rop                                       Ther-Act                                                               Modalities                                                                                                   "

## 2024-05-20 NOTE — PROGRESS NOTES
PT Evaluation     Today's date: 2024  Patient name: Andrea Clark  : 1945  MRN: [id number]  Referring provider: Taylor Watson MD  Dx:   Encounter Diagnosis     ICD-10-CM    1. Right hip pain  M25.551                      Assessment  Impairments: lacks appropriate home exercise program and pain with function  Symptom irritability: moderate  Barriers to therapy: Patient 79 y.o. male who presents to PT this date with a recent history of fall resultin gin right gluteus medius tear. He has difficulty with ambulation due to pain in the lateral right hip. He is responding well to a recent injection with decreased pain. He does demonstrate weakness and painful ROM of the right hip. Patient is expected to respond well to PT intervention to increase strength and increase tolerance to walking.     Plan  Patient would benefit from: PT eval and skilled physical therapy  Planned modality interventions: TENS, electrical stimulation/Russian stimulation, cryotherapy and thermotherapy: hydrocollator packs    Planned therapy interventions: manual therapy, neuromuscular re-education, therapeutic activities, therapeutic exercise and home exercise program    Frequency: 2x week  Duration in weeks: 8  Plan of Care beginning date: 2024  Plan of Care expiration date: 2024  Treatment plan discussed with: patient  Plan details: Patient's evaluation is completed. Patient was instructed with a HEP this date. Patient has scheduled further PT sessions for treatment.          Subjective Evaluation    History of Present Illness  Mechanism of injury: MRI findings - Diffuse lumbar spondylitic degenerative change with annular bulging, small disc herniations and endplate/facet hypertrophic changes. Multilevel mild canal stenosis. There is more pronounced right-sided foraminal narrowing and lateral recess stenosis at   L4-5 with compression of both the L4 and L5 nerves. At L5-S1 there is annular bulging with circumferential  endplate hypertrophic change resulting in mild compression of both L5 nerves within the neural foramen.    Patient notes that fell on ice (24) resulting in a tear of the glut med. Patient notes he did have a injection which has been helpful. 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 He notes difficulty with standing to work on his work bench or walking any distance. He notes that walking is painful and difficult. 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    Patient Goals  Patient goals for therapy: increased strength and decreased pain    Pain  Current pain ratin  At worst pain rating: 10 (walking)  Quality: needle-like    Social Support  Lives in: multiple-level home    Employment status: working    Diagnostic Tests  MRI studies: abnormal (see subjective)      Objective     Palpation     Right   Tenderness of the gluteus medius.     Neurological Testing     Sensation     Hip   Left Hip   Intact: light touch    Right Hip   Intact: light touch    Active Range of Motion   Left Hip   Normal active range of motion    Right Hip   Flexion: 100 degrees   Abduction: 20 degrees with pain  External rotation (90/90): 30 degrees with pain  Internal rotation (90/90): 15 degrees with pain    Strength/Myotome Testing     Left Hip   Planes of Motion   Flexion: 4+  Extension: 4+  Abduction: 4+  Adduction: 4+  External rotation: 4+  Internal rotation: 4+    Right Hip   Planes of Motion   Flexion: 4  Extension: 4  Abduction: 3  Adduction: 4  External rotation: 3+  Internal rotation: 3+    Tests     Additional Tests Details  Mild R gluteus medius lurch - non-compensated              Precautions: None     Daily Treatment Diary:      Initial Evaluation Date: 24  Compliance                      Visit Number 1                    Re-Eval  IE                 MC   Foto Captured Y                                                Manual                                    "                                                     Ther-Ex                      LTR                      PPT x10                     H/L Add 5\" x10                     H/L clamshells [place] x10                     bridges                      SLR                      Supine hip abd                      Standing hip abd/ ext             Mini squats             Lateral step up                      Forward step up                      Nu-step                      Neuro Re-Ed                      Side step                      Slow march             Tight rop                                       Ther-Act                                                               Modalities                                                                                   "

## 2024-05-29 ENCOUNTER — OFFICE VISIT (OUTPATIENT)
Dept: PHYSICAL THERAPY | Facility: CLINIC | Age: 79
End: 2024-05-29
Payer: MEDICARE

## 2024-05-29 DIAGNOSIS — M25.551 RIGHT HIP PAIN: Primary | ICD-10-CM

## 2024-05-29 PROCEDURE — 97110 THERAPEUTIC EXERCISES: CPT

## 2024-05-29 NOTE — PROGRESS NOTES
"Daily Note     Today's date: 2024  Patient name: Andrea Clark  : 1945  MRN: [id number]  Referring provider: Taylor Watson MD  Dx:   Encounter Diagnosis     ICD-10-CM    1. Right hip pain  M25.551                      Subjective: Patient reports R hip has been doing okay.       Objective: See treatment diary below      Assessment: Tolerated treatment well without complaint. He did note increased discomfort when performing supine and standing hip abduction. He was able to complete supine series, but had to stop after 5 repetitions with standing abductions. Patient required moderate verbal cues to perform exercises with appropriate technique and intensity. Patient would benefit from continued PT to increase R hip strength and mobility for improved function in ADLs.      Plan: Continue per plan of care.      Precautions: None     Daily Treatment Diary:      Initial Evaluation Date: 24  Compliance                    Visit Number 1 2                   Re-Eval  IE                 MC   Foto Captured Y                                              Manual                                                                                        Ther-Ex                      LTR  5\"x10                   PPT x10 5\"X10                   H/L Add 5\" x10 5\"x15                   H/L clamshells Orange x10 OTB 5\"x15                   bridges  x10                   SLR  x10 B                   Supine hip abd  X10 R                   Stand HR  20x           Standing hip abd/ ext  Abd 10x L, 5x R           Mini squats  10x           Lateral step up                      Forward step up                      Nu-step   L1 5 min                   Neuro Re-Ed                      Side step                      Slow march             Tight rop                                       Ther-Act                                                               Modalities                                                 "

## 2024-07-24 ENCOUNTER — HOSPITAL ENCOUNTER (OUTPATIENT)
Dept: RADIOLOGY | Facility: HOSPITAL | Age: 79
Discharge: HOME | End: 2024-07-24
Payer: MEDICARE

## 2024-07-24 DIAGNOSIS — M54.2 CERVICALGIA: ICD-10-CM

## 2024-07-24 PROCEDURE — 72040 X-RAY EXAM NECK SPINE 2-3 VW: CPT

## 2024-08-21 ENCOUNTER — HOSPITAL ENCOUNTER (OUTPATIENT)
Dept: NON INVASIVE DIAGNOSTICS | Facility: HOSPITAL | Age: 79
Discharge: HOME | End: 2024-08-21
Payer: MEDICARE

## 2024-08-21 VITALS
SYSTOLIC BLOOD PRESSURE: 140 MMHG | WEIGHT: 201.94 LBS | HEART RATE: 72 BPM | HEIGHT: 73 IN | DIASTOLIC BLOOD PRESSURE: 70 MMHG | BODY MASS INDEX: 26.76 KG/M2

## 2024-08-21 DIAGNOSIS — R60.0 LOCALIZED EDEMA: ICD-10-CM

## 2024-08-21 LAB
AORTIC VALVE MEAN VELOCITY: 6.7 M/S
APICAL FOUR CHAMBER EJECTION FRACTION: 45 %
ASCENDING AORTA: 3.1 CM
AV MEAN GRADIENT: 2 MMHG
AV PEAK GRADIENT: 4 MMHG
BSA FOR ECHO PROCEDURE: 2.16 M2
DOP CALC AO PEAK VEL: 0.95 M/S
DOP CALC AO VTI: 23.63 CM
DOP CALC LVOT AREA: 4.52 CM2
DOP CALC LVOT DIAMETER: 2.4 CM
FRACTIONAL SHORTENING: -3 (ref 28–44)
INTERVENTRICULAR SEPTUM IN DIASTOLE (PARASTERNAL SHORT AXIS VIEW): 1.5 CM
INTERVENTRICULAR SEPTUM: 1.5 CM (ref 0.6–1.1)
LAAS-AP4: 15.6 CM2
LEFT ATRIUM SIZE: 2.2 CM
LEFT INTERNAL DIMENSION IN SYSTOLE: 4 CM (ref 2.1–4)
LEFT VENTRICULAR INTERNAL DIMENSION IN DIASTOLE: 3.9 CM (ref 3.5–6)
LEFT VENTRICULAR POSTERIOR WALL IN END DIASTOLE: 1 CM
LEFT VENTRICULAR STROKE VOLUME: -2 ML
LVSV (TEICH): -2 ML
MV E'TISSUE VEL-SEP: 8 CM/S
RIGHT ATRIUM AREA SYSTOLE A4C: 18.8 CM2
RIGHT VENTRICLE ID DIMENSION: 3.9 CM
SL CV LV EF: 60
SL CV PED ECHO LEFT VENTRICLE DIASTOLIC VOLUME (MOD BIPLANE) 2D: 66 ML
SL CV PED ECHO LEFT VENTRICLE SYSTOLIC VOLUME (MOD BIPLANE) 2D: 68 ML
TRICUSPID ANNULAR PLANE SYSTOLIC EXCURSION: 3 CM

## 2024-08-21 PROCEDURE — 93306 TTE W/DOPPLER COMPLETE: CPT

## 2024-11-29 NOTE — TELEPHONE ENCOUNTER
Problem: Chronic Conditions and Co-morbidities  Goal: Patient's chronic conditions and co-morbidity symptoms are monitored and maintained or improved  11/28/2024 1943 by Lia Jacobsen RN  Outcome: Not Progressing  11/28/2024 1001 by Talia Izquierdo RN  Outcome: Progressing     Problem: Chronic Conditions and Co-morbidities  Goal: Patient's chronic conditions and co-morbidity symptoms are monitored and maintained or improved  11/28/2024 1943 by Lia Jacobsen, RN  Outcome: Not Progressing  11/28/2024 1001 by Talia Izquierdo RN  Outcome: Progressing      1  Were you within 6 feet or less, for up to 15 minutes or more with a person that has a confirmed COVID-19 test? Patient  2  What was the date of your exposure? 4/5/22  3  Are you experiencing any symptoms attributed to the virus?  (Assess for SOB, cough, fever, difficulty breathing) Denies (had headaches muscle aches, resolved)   4  HIGH RISK: Do you have any history heart or lung conditions, weakened immune system, diabetes, Asthma, CHF, HIV, COPD, Chemo, renal failure, sickle cell, etc? Blood clot in R lung 2 years ago    5  PREGNANCY: Are you pregnant or did you recently give birth?  N/A  6  VACCINE: "Have you gotten the COVID-19 vaccine?" If Yes ask: "Which one, how many shots, when did you get it?" Denies

## 2025-05-01 ENCOUNTER — APPOINTMENT (EMERGENCY)
Dept: CT IMAGING | Facility: HOSPITAL | Age: 80
End: 2025-05-01
Payer: MEDICARE

## 2025-05-01 ENCOUNTER — HOSPITAL ENCOUNTER (EMERGENCY)
Facility: HOSPITAL | Age: 80
Discharge: HOME | End: 2025-05-01
Attending: EMERGENCY MEDICINE
Payer: MEDICARE

## 2025-05-01 ENCOUNTER — APPOINTMENT (EMERGENCY)
Dept: RADIOLOGY | Facility: HOSPITAL | Age: 80
End: 2025-05-01
Payer: MEDICARE

## 2025-05-01 VITALS
RESPIRATION RATE: 16 BRPM | HEART RATE: 70 BPM | TEMPERATURE: 97.3 F | DIASTOLIC BLOOD PRESSURE: 69 MMHG | BODY MASS INDEX: 27.81 KG/M2 | OXYGEN SATURATION: 96 % | WEIGHT: 210.76 LBS | SYSTOLIC BLOOD PRESSURE: 142 MMHG

## 2025-05-01 DIAGNOSIS — R07.89 ATYPICAL CHEST PAIN: Primary | ICD-10-CM

## 2025-05-01 LAB
2HR DELTA HS TROPONIN: 1 NG/L
ALBUMIN SERPL BCG-MCNC: 4.2 G/DL (ref 3.5–5)
ALP SERPL-CCNC: 52 U/L (ref 34–104)
ALT SERPL W P-5'-P-CCNC: 27 U/L (ref 7–52)
ANION GAP SERPL CALCULATED.3IONS-SCNC: 5 MMOL/L (ref 4–13)
APTT PPP: 26 SECONDS (ref 23–34)
AST SERPL W P-5'-P-CCNC: 27 U/L (ref 13–39)
ATRIAL RATE: 72 BPM
BASOPHILS # BLD AUTO: 0.03 THOUSANDS/ÂΜL (ref 0–0.1)
BASOPHILS NFR BLD AUTO: 1 % (ref 0–1)
BILIRUB SERPL-MCNC: 0.69 MG/DL (ref 0.2–1)
BNP SERPL-MCNC: 23 PG/ML (ref 0–100)
BUN SERPL-MCNC: 30 MG/DL (ref 5–25)
CALCIUM SERPL-MCNC: 9 MG/DL (ref 8.4–10.2)
CARDIAC TROPONIN I PNL SERPL HS: 5 NG/L (ref ?–50)
CARDIAC TROPONIN I PNL SERPL HS: 6 NG/L (ref ?–50)
CHLORIDE SERPL-SCNC: 105 MMOL/L (ref 96–108)
CO2 SERPL-SCNC: 30 MMOL/L (ref 21–32)
CREAT SERPL-MCNC: 1.25 MG/DL (ref 0.6–1.3)
D DIMER PPP FEU-MCNC: 1.73 UG/ML FEU
EOSINOPHIL # BLD AUTO: 0.29 THOUSAND/ÂΜL (ref 0–0.61)
EOSINOPHIL NFR BLD AUTO: 7 % (ref 0–6)
ERYTHROCYTE [DISTWIDTH] IN BLOOD BY AUTOMATED COUNT: 13.2 % (ref 11.6–15.1)
GFR SERPL CREATININE-BSD FRML MDRD: 54 ML/MIN/1.73SQ M
GLUCOSE SERPL-MCNC: 97 MG/DL (ref 65–140)
HCT VFR BLD AUTO: 41.4 % (ref 36.5–49.3)
HGB BLD-MCNC: 14.4 G/DL (ref 12–17)
IMM GRANULOCYTES # BLD AUTO: 0.01 THOUSAND/UL (ref 0–0.2)
IMM GRANULOCYTES NFR BLD AUTO: 0 % (ref 0–2)
INR PPP: 0.92 (ref 0.85–1.19)
LACTATE SERPL-SCNC: 0.9 MMOL/L (ref 0.5–2)
LIPASE SERPL-CCNC: 61 U/L (ref 11–82)
LYMPHOCYTES # BLD AUTO: 1.33 THOUSANDS/ÂΜL (ref 0.6–4.47)
LYMPHOCYTES NFR BLD AUTO: 31 % (ref 14–44)
MAGNESIUM SERPL-MCNC: 2.1 MG/DL (ref 1.9–2.7)
MCH RBC QN AUTO: 31.7 PG (ref 26.8–34.3)
MCHC RBC AUTO-ENTMCNC: 34.8 G/DL (ref 31.4–37.4)
MCV RBC AUTO: 91 FL (ref 82–98)
MONOCYTES # BLD AUTO: 0.47 THOUSAND/ÂΜL (ref 0.17–1.22)
MONOCYTES NFR BLD AUTO: 11 % (ref 4–12)
NEUTROPHILS # BLD AUTO: 2.17 THOUSANDS/ÂΜL (ref 1.85–7.62)
NEUTS SEG NFR BLD AUTO: 50 % (ref 43–75)
NRBC BLD AUTO-RTO: 0 /100 WBCS
P AXIS: 60 DEGREES
PLATELET # BLD AUTO: 153 THOUSANDS/UL (ref 149–390)
PMV BLD AUTO: 9.1 FL (ref 8.9–12.7)
POTASSIUM SERPL-SCNC: 4.3 MMOL/L (ref 3.5–5.3)
PR INTERVAL: 180 MS
PROT SERPL-MCNC: 6.3 G/DL (ref 6.4–8.4)
PROTHROMBIN TIME: 12.8 SECONDS (ref 12.3–15)
QRS AXIS: 17 DEGREES
QRSD INTERVAL: 98 MS
QT INTERVAL: 398 MS
QTC INTERVAL: 435 MS
RBC # BLD AUTO: 4.54 MILLION/UL (ref 3.88–5.62)
SODIUM SERPL-SCNC: 140 MMOL/L (ref 135–147)
T WAVE AXIS: 51 DEGREES
VENTRICULAR RATE: 72 BPM
WBC # BLD AUTO: 4.3 THOUSAND/UL (ref 4.31–10.16)

## 2025-05-01 PROCEDURE — 83880 ASSAY OF NATRIURETIC PEPTIDE: CPT | Performed by: EMERGENCY MEDICINE

## 2025-05-01 PROCEDURE — 36415 COLL VENOUS BLD VENIPUNCTURE: CPT | Performed by: EMERGENCY MEDICINE

## 2025-05-01 PROCEDURE — 83690 ASSAY OF LIPASE: CPT | Performed by: EMERGENCY MEDICINE

## 2025-05-01 PROCEDURE — 84484 ASSAY OF TROPONIN QUANT: CPT | Performed by: EMERGENCY MEDICINE

## 2025-05-01 PROCEDURE — 85730 THROMBOPLASTIN TIME PARTIAL: CPT | Performed by: EMERGENCY MEDICINE

## 2025-05-01 PROCEDURE — 83605 ASSAY OF LACTIC ACID: CPT | Performed by: EMERGENCY MEDICINE

## 2025-05-01 PROCEDURE — 99285 EMERGENCY DEPT VISIT HI MDM: CPT

## 2025-05-01 PROCEDURE — 83735 ASSAY OF MAGNESIUM: CPT | Performed by: EMERGENCY MEDICINE

## 2025-05-01 PROCEDURE — 85610 PROTHROMBIN TIME: CPT | Performed by: EMERGENCY MEDICINE

## 2025-05-01 PROCEDURE — 85379 FIBRIN DEGRADATION QUANT: CPT | Performed by: EMERGENCY MEDICINE

## 2025-05-01 PROCEDURE — 99285 EMERGENCY DEPT VISIT HI MDM: CPT | Performed by: EMERGENCY MEDICINE

## 2025-05-01 PROCEDURE — 80053 COMPREHEN METABOLIC PANEL: CPT | Performed by: EMERGENCY MEDICINE

## 2025-05-01 PROCEDURE — 85025 COMPLETE CBC W/AUTO DIFF WBC: CPT | Performed by: EMERGENCY MEDICINE

## 2025-05-01 PROCEDURE — 71045 X-RAY EXAM CHEST 1 VIEW: CPT

## 2025-05-01 PROCEDURE — 93005 ELECTROCARDIOGRAM TRACING: CPT

## 2025-05-01 PROCEDURE — 71275 CT ANGIOGRAPHY CHEST: CPT

## 2025-05-01 RX ORDER — MAGNESIUM HYDROXIDE/ALUMINUM HYDROXICE/SIMETHICONE 120; 1200; 1200 MG/30ML; MG/30ML; MG/30ML
30 SUSPENSION ORAL ONCE
Status: COMPLETED | OUTPATIENT
Start: 2025-05-01 | End: 2025-05-01

## 2025-05-01 RX ORDER — LIDOCAINE HYDROCHLORIDE 20 MG/ML
15 SOLUTION OROPHARYNGEAL ONCE
Status: COMPLETED | OUTPATIENT
Start: 2025-05-01 | End: 2025-05-01

## 2025-05-01 RX ADMIN — LIDOCAINE HYDROCHLORIDE 15 ML: 20 SOLUTION ORAL at 11:38

## 2025-05-01 RX ADMIN — IOHEXOL 85 ML: 350 INJECTION, SOLUTION INTRAVENOUS at 10:49

## 2025-05-01 RX ADMIN — ALUMINUM HYDROXIDE, MAGNESIUM HYDROXIDE, AND DIMETHICONE 30 ML: 200; 20; 200 SUSPENSION ORAL at 11:38

## 2025-05-01 NOTE — ED PROVIDER NOTES
Time reflects when diagnosis was documented in both MDM as applicable and the Disposition within this note       Time User Action Codes Description Comment    5/1/2025 12:19 PM Luis Schultz Add [R07.89] Atypical chest pain           ED Disposition       ED Disposition   Discharge    Condition   Stable    Date/Time   Thu May 1, 2025 12:19 PM    Comment   Andrea Clark discharge to home/self care.                   Assessment & Plan       Medical Decision Making  Amount and/or Complexity of Data Reviewed  Labs: ordered. Decision-making details documented in ED Course.  Radiology: ordered and independent interpretation performed. Decision-making details documented in ED Course.  ECG/medicine tests: ordered and independent interpretation performed. Decision-making details documented in ED Course.  Discussion of management or test interpretation with external provider(s): Differential diagnosis includes but not limited to STEMI, NSTEMI, PE, pneumonia, pneumothorax, musculoskeletal chest pain, costochondritis, gastritis, cholelithiasis, contusion, strain    Risk  OTC drugs.  Prescription drug management.        ED Course as of 05/01/25 1219   Thu May 01, 2025   1030 Blood pressure is currently 146/60.   1041 hs TnI 0hr: 5   1217 Troponin negative x 2.  Will follow-up with his PCP.  Strict return precautions given.  Patient and wife voiced understanding.  Agrees with plan of care.       Medications   aluminum-magnesium hydroxide-simethicone (MAALOX) oral suspension 30 mL (30 mL Oral Given 5/1/25 1138)   Lidocaine Viscous HCl (XYLOCAINE) 2 % mucosal solution 15 mL (15 mL Swish & Spit Given 5/1/25 1138)   iohexol (OMNIPAQUE) 350 MG/ML injection (MULTI-DOSE) 85 mL (85 mL Intravenous Given 5/1/25 1049)       ED Risk Strat Scores   HEART Risk Score      Flowsheet Row Most Recent Value   Heart Score Risk Calculator    History 0 Filed at: 05/01/2025 0945   ECG 1 Filed at: 05/01/2025 0945   Age 2 Filed at: 05/01/2025 0984    Risk Factors 1 Filed at: 05/01/2025 0945   Troponin 0 Filed at: 05/01/2025 0945   HEART Score 4 Filed at: 05/01/2025 0945          HEART Risk Score      Flowsheet Row Most Recent Value   Heart Score Risk Calculator    History 0 Filed at: 05/01/2025 0945   ECG 1 Filed at: 05/01/2025 0945   Age 2 Filed at: 05/01/2025 0945   Risk Factors 1 Filed at: 05/01/2025 0945   Troponin 0 Filed at: 05/01/2025 0945   HEART Score 4 Filed at: 05/01/2025 0945                      No data recorded        SBIRT 20yo+      Flowsheet Row Most Recent Value   Initial Alcohol Screen: US AUDIT-C     1. How often do you have a drink containing alcohol? 0 Filed at: 05/01/2025 0940   2. How many drinks containing alcohol do you have on a typical day you are drinking?  0 Filed at: 05/01/2025 0940   3b. FEMALE Any Age, or MALE 65+: How often do you have 4 or more drinks on one occassion? 0 Filed at: 05/01/2025 0940   Audit-C Score 0 Filed at: 05/01/2025 0940   CARIN: How many times in the past year have you...    Used an illegal drug or used a prescription medication for non-medical reasons? Never Filed at: 05/01/2025 0940                            History of Present Illness       Chief Complaint   Patient presents with    Chest Pain     Patient presents to the ED with complaints of chest pain/tightness since this AM. Patient denies cardiac history.        Past Medical History:   Diagnosis Date    Basal cell carcinoma of skin     multiple sites    Hypercholesterolemia     Hypertension     Skin cancer (melanoma) (HCC)     stage 0      Past Surgical History:   Procedure Laterality Date    ARM SKIN LESION BIOPSY / EXCISION        Family History   Problem Relation Age of Onset    Arthritis Mother       Social History     Tobacco Use    Smoking status: Former    Smokeless tobacco: Never   Vaping Use    Vaping status: Never Used   Substance Use Topics    Alcohol use: Not Currently    Drug use: Not Currently      E-Cigarette/Vaping    E-Cigarette Use  Never User       E-Cigarette/Vaping Substances      I have reviewed and agree with the history as documented.     Patient complains of parasternal chest tightness without radiation starting at approximately 8 AM today.  Burping a lot.  Slightly nauseated.  No diaphoresis.  Has slight nasal congestion.  No change with deep breath or movement.  Does have a past history of PE.  No recent travel.  No increased leg swelling.  No recent surgeries.  Nothing taken prior to arrival.      History provided by:  Patient   used: No    Chest Pain  Pain location:  Substernal area  Pain quality: tightness    Pain radiates to:  Does not radiate  Pain radiates to the back: no    Pain severity:  Mild  Onset quality:  Sudden  Duration:  90 minutes  Timing:  Constant  Progression:  Waxing and waning  Chronicity:  New  Context: at rest    Relieved by:  Nothing  Worsened by:  Nothing tried  Ineffective treatments:  None tried  Associated symptoms: nausea    Associated symptoms: no abdominal pain, no AICD problem, no back pain, no claudication, no cough, no dizziness, no dysphagia, no fever, no headache, no heartburn, no palpitations, no shortness of breath, not vomiting and no weakness        Review of Systems   Constitutional:  Negative for chills and fever.   HENT:  Positive for congestion. Negative for ear pain, hearing loss, sore throat, trouble swallowing and voice change.    Eyes:  Negative for pain and discharge.   Respiratory:  Negative for cough, shortness of breath and wheezing.    Cardiovascular:  Positive for chest pain. Negative for palpitations and claudication.   Gastrointestinal:  Positive for nausea. Negative for abdominal pain, blood in stool, constipation, diarrhea, heartburn and vomiting.   Genitourinary:  Negative for dysuria, flank pain, frequency and hematuria.   Musculoskeletal:  Negative for back pain, joint swelling, neck pain and neck stiffness.   Skin:  Negative for rash and wound.    Neurological:  Negative for dizziness, seizures, syncope, facial asymmetry, weakness and headaches.   Psychiatric/Behavioral:  Negative for hallucinations, self-injury and suicidal ideas.    All other systems reviewed and are negative.          Objective       ED Triage Vitals [05/01/25 0938]   Temperature Pulse Blood Pressure Respirations SpO2 Patient Position - Orthostatic VS   (!) 97.3 °F (36.3 °C) 92 (!) 180/82 16 96 % Lying      Temp Source Heart Rate Source BP Location FiO2 (%) Pain Score    Temporal Monitor Left arm -- 4      Vitals      Date and Time Temp Pulse SpO2 Resp BP Pain Score FACES Pain Rating User   05/01/25 1115 -- 70 96 % 16 142/69 -- -- BF   05/01/25 1100 -- 69 96 % 16 154/66 -- -- NB   05/01/25 1000 -- 68 96 % 16 143/62 -- -- SBE   05/01/25 0938 97.3 °F (36.3 °C) 92 96 % 16 180/82 4 -- RR            Physical Exam  Vitals and nursing note reviewed.   Constitutional:       General: He is not in acute distress.     Appearance: He is well-developed.   HENT:      Head: Normocephalic and atraumatic.      Right Ear: External ear normal.      Left Ear: External ear normal.   Eyes:      General: No scleral icterus.        Right eye: No discharge.         Left eye: No discharge.      Extraocular Movements: Extraocular movements intact.      Conjunctiva/sclera: Conjunctivae normal.   Cardiovascular:      Rate and Rhythm: Normal rate and regular rhythm.      Heart sounds: Normal heart sounds. No murmur heard.  Pulmonary:      Effort: Pulmonary effort is normal.      Breath sounds: Normal breath sounds. No wheezing or rales.   Abdominal:      General: Bowel sounds are normal. There is no distension.      Palpations: Abdomen is soft.      Tenderness: There is no abdominal tenderness. There is no guarding or rebound.   Musculoskeletal:         General: No deformity. Normal range of motion.      Cervical back: Normal range of motion and neck supple.   Skin:     General: Skin is warm and dry.      Findings:  No rash.   Neurological:      General: No focal deficit present.      Mental Status: He is alert and oriented to person, place, and time.      Cranial Nerves: No cranial nerve deficit.   Psychiatric:         Mood and Affect: Mood normal.         Behavior: Behavior normal.         Thought Content: Thought content normal.         Judgment: Judgment normal.         Results Reviewed       Procedure Component Value Units Date/Time    HS Troponin I 2hr [[id number]]  (Normal) Collected: 05/01/25 1146    Lab Status: Final result Specimen: Blood from Arm, Left Updated: 05/01/25 1216     hs TnI 2hr 6 ng/L      Delta 2hr hsTnI 1 ng/L     HS Troponin I 4hr [[id number]]     Lab Status: No result Specimen: Blood     Comprehensive metabolic panel [[id number]]  (Abnormal) Collected: 05/01/25 1004    Lab Status: Final result Specimen: Blood from Arm, Left Updated: 05/01/25 1041     Sodium 140 mmol/L      Potassium 4.3 mmol/L      Chloride 105 mmol/L      CO2 30 mmol/L      ANION GAP 5 mmol/L      BUN 30 mg/dL      Creatinine 1.25 mg/dL      Glucose 97 mg/dL      Calcium 9.0 mg/dL      AST 27 U/L      ALT 27 U/L      Alkaline Phosphatase 52 U/L      Total Protein 6.3 g/dL      Albumin 4.2 g/dL      Total Bilirubin 0.69 mg/dL      eGFR 54 ml/min/1.73sq m     Narrative:      [employer] Kid[employer] guidelines for Chronic Kidney Disease (CKD):     Stage 1 with normal or high GFR (GFR > 90 mL/min/1.73 square meters)    Stage 2 Mild CKD (GFR = 60-89 mL/min/1.73 square meters)    Stage 3A Moderate CKD (GFR = 45-59 mL/min/1.73 square meters)    Stage 3B Moderate CKD (GFR = 30-44 mL/min/1.73 square meters)    Stage 4 Severe CKD (GFR = 15-29 mL/min/1.73 square meters)    Stage 5 End Stage CKD (GFR <15 mL/min/1.73 square meters)  Note: GFR calculation is accurate only with a steady state creatinine    Magnesium [[id number]]  (Normal) Collected: 05/01/25 1004    Lab Status: Final result Specimen: Blood from Arm, Left Updated: 05/01/25  1041     Magnesium 2.1 mg/dL     Lipase [[id number]]  (Normal) Collected: 05/01/25 1004    Lab Status: Final result Specimen: Blood from Arm, Left Updated: 05/01/25 1041     Lipase 61 u/L     Lactic acid, plasma (w/reflex if result > 2.0) [[id number]]  (Normal) Collected: 05/01/25 1004    Lab Status: Final result Specimen: Blood from Arm, Left Updated: 05/01/25 1041     LACTIC ACID 0.9 mmol/L     Narrative:      Result may be elevated if tourniquet was used during collection.    HS Troponin 0hr (reflex protocol) [[id number]]  (Normal) Collected: 05/01/25 1004    Lab Status: Final result Specimen: Blood from Arm, Left Updated: 05/01/25 1040     hs TnI 0hr 5 ng/L     B-Type Natriuretic Peptide(BNP) [[id number]]  (Normal) Collected: 05/01/25 1004    Lab Status: Final result Specimen: Blood from Arm, Left Updated: 05/01/25 1039     BNP 23 pg/mL     D-Dimer [[id number]]  (Abnormal) Collected: 05/01/25 1004    Lab Status: Final result Specimen: Blood from Arm, Left Updated: 05/01/25 1033     D-Dimer, Quant 1.73 ug/ml FEU     Narrative:      In the evaluation for possible pulmonary embolism, in the appropriate (Well's Score of 4 or less) patient, the age adjusted d-dimer cutoff for this patient can be calculated as:    Age x 0.01 (in ug/mL) for Age-adjusted D-dimer exclusion threshold for a patient over 50 years.    Protime-INR [[id number]]  (Normal) Collected: 05/01/25 1004    Lab Status: Final result Specimen: Blood from Arm, Left Updated: 05/01/25 1029     Protime 12.8 seconds      INR 0.92    Narrative:      INR Therapeutic Range    Indication                                             INR Range      Atrial Fibrillation                                               2.0-3.0  Hypercoagulable State                                    2.0.2.3  Left Ventricular Asist Device                            2.0-3.0  Mechanical Heart Valve                                  -    Aortic(with afib, MI, embolism, HF, LA enlargement,    and/or  coagulopathy)                                     2.0-3.0 (2.5-3.5)     Mitral                                                             2.5-3.5  Prosthetic/Bioprosthetic Heart Valve               2.0-3.0  Venous thromboembolism (VTE: VT, PE        2.0-3.0    APTT [[id number]]  (Normal) Collected: 05/01/25 1004    Lab Status: Final result Specimen: Blood from Arm, Left Updated: 05/01/25 1029     PTT 26 seconds     CBC and differential [[id number]]  (Abnormal) Collected: 05/01/25 1004    Lab Status: Final result Specimen: Blood from Arm, Left Updated: 05/01/25 1010     WBC 4.30 Thousand/uL      RBC 4.54 Million/uL      Hemoglobin 14.4 g/dL      Hematocrit 41.4 %      MCV 91 fL      MCH 31.7 pg      MCHC 34.8 g/dL      RDW 13.2 %      MPV 9.1 fL      Platelets 153 Thousands/uL      nRBC 0 /100 WBCs      Segmented % 50 %      Immature Grans % 0 %      Lymphocytes % 31 %      Monocytes % 11 %      Eosinophils Relative 7 %      Basophils Relative 1 %      Absolute Neutrophils 2.17 Thousands/µL      Absolute Immature Grans 0.01 Thousand/uL      Absolute Lymphocytes 1.33 Thousands/µL      Absolute Monocytes 0.47 Thousand/µL      Eosinophils Absolute 0.29 Thousand/µL      Basophils Absolute 0.03 Thousands/µL             CTA chest pe study   Final Interpretation by William Fountain MD (05/01 2779)      Assessment of subsegmental pulmonary arteries is limited due to poor opacification. Within this limitation, no evidence of a central, lobar, or segmental pulmonary embolism.                  Workstation performed: [id number]         XR chest 1 view portable   ED Interpretation by Luis Schultz MD (05/01 1013)   NAD          ECG 12 Lead Documentation Only    Date/Time: 5/1/2025 9:44 AM    Performed by: Luis Schultz MD  Authorized by: Luis Schultz MD    ECG reviewed by me, the ED Provider: yes    Patient location:  ED  Previous ECG:     Previous ECG:  Compared to current    Similarity:  No change  Rate:     ECG rate:   70  Rhythm:     Rhythm: sinus rhythm    Ectopy:     Ectopy: none    QRS:     QRS axis:  Normal      ED Medication and Procedure Management   Prior to Admission Medications   Prescriptions Last Dose Informant Patient Reported? Taking?   Ascorbic Acid (VITAMIN C) 100 MG tablet   Yes No   Sig: Take 3,000 mg by mouth daily   Multiple Vitamin (MULTIVITAMIN) capsule   Yes No   Sig: Take 1 capsule by mouth daily   amLODIPine (NORVASC) 5 mg tablet   No No   Sig: Take 1 tablet (5 mg total) by mouth daily   aspirin (ECOTRIN LOW STRENGTH) 81 mg EC tablet  Self Yes No   Sig: Take 81 mg by mouth daily   atorvastatin (LIPITOR) 10 mg tablet   Yes No   Sig: Take 10 mg by mouth 2 (two) times a week Monday and Friday   co-enzyme Q-10 30 MG capsule   Yes No   Sig: Take 100 mg by mouth daily      Facility-Administered Medications: None     Patient's Medications   Discharge Prescriptions    No medications on file     No discharge procedures on file.  ED SEPSIS DOCUMENTATION   Time reflects when diagnosis was documented in both MDM as applicable and the Disposition within this note       Time User Action Codes Description Comment    5/1/2025 12:19 PM Luis Schultz Add [R07.89] Atypical chest pain                  Luis Schultz MD  05/01/25 1219

## 2025-05-01 NOTE — DISCHARGE INSTRUCTIONS
Patient Education     Chest Pain, Adult ED   General Information   You came to the Emergency Department (ED) for chest pain. The doctor feels that the risk of a serious cause for your chest pain is low. Many things can cause chest pain. Some are serious things like heart disease or lung disease. Less serious things like stomach problems can also cause chest pain.  The doctors may not be able to find all serious causes of chest pain the first time they see you. It is important that you follow up with your doctor. You may be waiting on some test results. The staff will notify you if there are concerning results.  What care is needed at home?   Call your regular doctor to let them know you were in the ED. Make a follow-up appointment if you were told to.  Follow your regular doctor’s orders to keep your blood pressure, cholesterol, and high blood sugar (diabetes) under control.  If you smoke, try to quit. Your doctor or nurse can help.  Keep a healthy weight. If you are too heavy, lose weight.  Eat a healthy diet, as discussed with your doctor or nurse.  When do I need to get emergency help?   Call for an ambulance if:   You have signs of a heart attack, which may include:  Severe chest pain, pressure, or discomfort with:  Breathing trouble; sweating; upset stomach; or cold, clammy skin.  Pain in your arms, back, or jaw.  Worse pain with activity like walking up stairs.  Fast or irregular heartbeat.  Feeling dizzy, faint, or weak.  When do I need to call the doctor?   You have a fever of 100.4°F (38°C) or higher or chills.  You cough up yellow or green mucus.  You are more tired than normal or more trouble breathing with activity.  You have new or worsening symptoms.  Last Reviewed Date   2020-06-28  Consumer Information Use and Disclaimer   This generalized information is a limited summary of diagnosis, treatment, and/or medication information. It is not meant to be comprehensive and should be used as a tool to help  the user understand and/or assess potential diagnostic and treatment options. It does NOT include all information about conditions, treatments, medications, side effects, or risks that may apply to a specific patient. It is not intended to be medical advice or a substitute for the medical advice, diagnosis, or treatment of a health care provider based on the health care provider's examination and assessment of a patient’s specific and unique circumstances. Patients must speak with a health care provider for complete information about their health, medical questions, and treatment options, including any risks or benefits regarding use of medications. This information does not endorse any treatments or medications as safe, effective, or approved for treating a specific patient. [employer] and its affiliates disclaim any warranty or liability relating to this information or the use thereof. The use of this information is governed by the Terms of Use, available at https://www.[employer].com/en/know/clinical-effectiveness-terms   Copyright   Copyright © 2024 [employer] and its affiliates and/or licensors. All rights reserved.

## 2025-05-28 ENCOUNTER — HOSPITAL ENCOUNTER (OUTPATIENT)
Dept: NON INVASIVE DIAGNOSTICS | Facility: HOSPITAL | Age: 80
Discharge: HOME | End: 2025-05-28
Attending: FAMILY MEDICINE
Payer: MEDICARE

## 2025-05-28 DIAGNOSIS — R55 SYNCOPE AND COLLAPSE: ICD-10-CM

## 2025-05-28 PROCEDURE — 93880 EXTRACRANIAL BILAT STUDY: CPT

## 2025-05-28 PROCEDURE — 93880 EXTRACRANIAL BILAT STUDY: CPT | Performed by: SURGERY
